# Patient Record
Sex: FEMALE | Race: ASIAN | NOT HISPANIC OR LATINO | Employment: STUDENT | URBAN - METROPOLITAN AREA
[De-identification: names, ages, dates, MRNs, and addresses within clinical notes are randomized per-mention and may not be internally consistent; named-entity substitution may affect disease eponyms.]

---

## 2017-03-29 ENCOUNTER — TRANSCRIBE ORDERS (OUTPATIENT)
Dept: ADMINISTRATIVE | Facility: HOSPITAL | Age: 22
End: 2017-03-29

## 2017-03-29 DIAGNOSIS — R10.32 ABDOMINAL PAIN, LEFT LOWER QUADRANT: Primary | ICD-10-CM

## 2017-03-29 DIAGNOSIS — R10.31 ABDOMINAL PAIN, RIGHT LOWER QUADRANT: ICD-10-CM

## 2017-03-29 DIAGNOSIS — R10.13 ABDOMINAL PAIN, EPIGASTRIC: ICD-10-CM

## 2017-04-05 ENCOUNTER — HOSPITAL ENCOUNTER (OUTPATIENT)
Dept: RADIOLOGY | Facility: HOSPITAL | Age: 22
Discharge: HOME | End: 2017-04-05
Attending: INTERNAL MEDICINE
Payer: COMMERCIAL

## 2017-04-05 ENCOUNTER — HOSPITAL ENCOUNTER (OUTPATIENT)
Dept: RADIOLOGY | Facility: HOSPITAL | Age: 22
Discharge: HOME | End: 2017-04-05
Payer: COMMERCIAL

## 2017-04-05 DIAGNOSIS — R10.31 ABDOMINAL PAIN, RIGHT LOWER QUADRANT: ICD-10-CM

## 2017-04-05 DIAGNOSIS — R10.32 ABDOMINAL PAIN, LEFT LOWER QUADRANT: ICD-10-CM

## 2017-04-05 DIAGNOSIS — R10.13 ABDOMINAL PAIN, EPIGASTRIC: ICD-10-CM

## 2017-04-05 PROCEDURE — 76830 TRANSVAGINAL US NON-OB: CPT

## 2017-04-05 PROCEDURE — 76700 US EXAM ABDOM COMPLETE: CPT

## 2017-04-05 PROCEDURE — 76856 US EXAM PELVIC COMPLETE: CPT

## 2017-09-01 ENCOUNTER — TRANSCRIBE ORDERS (OUTPATIENT)
Dept: ADMINISTRATIVE | Facility: HOSPITAL | Age: 22
End: 2017-09-01

## 2017-09-01 DIAGNOSIS — I10 UNCONTROLLED HYPERTENSION: Primary | ICD-10-CM

## 2017-09-02 ENCOUNTER — HOSPITAL ENCOUNTER (OUTPATIENT)
Dept: RADIOLOGY | Facility: HOSPITAL | Age: 22
Discharge: HOME | End: 2017-09-02
Attending: INTERNAL MEDICINE
Payer: COMMERCIAL

## 2017-09-02 DIAGNOSIS — I10 UNCONTROLLED HYPERTENSION: ICD-10-CM

## 2017-09-02 PROCEDURE — 93975 VASCULAR STUDY: CPT

## 2021-02-11 ENCOUNTER — TELEPHONE (OUTPATIENT)
Dept: GASTROENTEROLOGY | Facility: CLINIC | Age: 26
End: 2021-02-11

## 2021-02-11 NOTE — TELEPHONE ENCOUNTER
Patient called leaving message to call and schedule an appt with Dr Ai Miner  I returned call, 2 times, phone ran twice and went to busy signal  I tried a third time and went straight to voicemail  Left message for her to call back

## 2021-02-11 NOTE — TELEPHONE ENCOUNTER
Pts mom called asking if pt could have a colonoscopy done? In [address] the recall is for 5/24, She had on in 2019    Mom says pt can feel a polyp  I offered her an OV  She asked that I message you and ask  Please advise what you would like to do  Thank you

## 2021-02-12 NOTE — TELEPHONE ENCOUNTER
LMOM for pt to call back to see when she wants to get scheduled for colon  Will try again next week to reach pt

## 2021-04-08 ENCOUNTER — TELEPHONE (OUTPATIENT)
Dept: GASTROENTEROLOGY | Facility: CLINIC | Age: 26
End: 2021-04-08

## 2021-04-08 NOTE — TELEPHONE ENCOUNTER
Spoke to mother, Jorge A Butterfield whom informed pt needs to r/s her colon to July due to college exams  Pt is looking for 7/19, 7/21 or 7/22 with Dr Evie Henriquez  I told her that I would call her to r/s pt once schedule for July opens up

## 2021-04-22 NOTE — TELEPHONE ENCOUNTER
See below  Only 7/6 is open so far for Dr Feli Howell  Will continue to watch [facility] schedule for July and the requested dates that pt is asking for

## 2021-04-30 ENCOUNTER — PREP FOR PROCEDURE (OUTPATIENT)
Dept: GASTROENTEROLOGY | Facility: CLINIC | Age: 26
End: 2021-04-30

## 2021-04-30 DIAGNOSIS — K62.6 ULCER OF ANUS AND RECTUM: ICD-10-CM

## 2021-04-30 DIAGNOSIS — K58.0 IRRITABLE BOWEL SYNDROME WITH DIARRHEA: ICD-10-CM

## 2021-04-30 DIAGNOSIS — Z86.010 HX OF COLONIC POLYPS: Primary | ICD-10-CM

## 2021-04-30 NOTE — TELEPHONE ENCOUNTER
Pt scheduled at [facility] [place] on 7/19/21 due to needing that date with Dr Taylor Macario  Instructions mailed to pt

## 2021-06-01 ENCOUNTER — PREP FOR PROCEDURE (OUTPATIENT)
Dept: GASTROENTEROLOGY | Facility: CLINIC | Age: 26
End: 2021-06-01

## 2021-06-01 ENCOUNTER — NURSE TRIAGE (OUTPATIENT)
Dept: OTHER | Facility: OTHER | Age: 26
End: 2021-06-01

## 2021-06-01 ENCOUNTER — TELEPHONE (OUTPATIENT)
Dept: PREADMISSION TESTING | Facility: HOSPITAL | Age: 26
End: 2021-06-01

## 2021-06-01 DIAGNOSIS — Z01.818 PREOPERATIVE TESTING: Primary | ICD-10-CM

## 2021-06-01 DIAGNOSIS — Z01.818 PREOPERATIVE TESTING: ICD-10-CM

## 2021-06-01 PROCEDURE — U0005 INFEC AGEN DETEC AMPLI PROBE: HCPCS | Performed by: INTERNAL MEDICINE

## 2021-06-01 PROCEDURE — U0003 INFECTIOUS AGENT DETECTION BY NUCLEIC ACID (DNA OR RNA); SEVERE ACUTE RESPIRATORY SYNDROME CORONAVIRUS 2 (SARS-COV-2) (CORONAVIRUS DISEASE [COVID-19]), AMPLIFIED PROBE TECHNIQUE, MAKING USE OF HIGH THROUGHPUT TECHNOLOGIES AS DESCRIBED BY CMS-2020-01-R: HCPCS | Performed by: INTERNAL MEDICINE

## 2021-06-01 NOTE — TELEPHONE ENCOUNTER
Patient is requesting a covid test and does not have a [facility]'s PCP  Reports having an out of network PCP  Order was previously placed by GI  Patient informed of closest testing site and was advised of hours of operation, address, to wear a mask, and to stay in the car  Patient verbalized understanding  Link sent to patients email address to create a [employer] account to check for results  Reason for Disposition   [1] COVID-19 infection suspected by caller or triager AND [2] mild symptoms (cough, fever, or others) AND [7] no complications or SOB    Answer Assessment - Initial Assessment Questions  Were you within 6 feet or less, for up to 15 minutes or more with a person that has a confirmed COVID-19 test? Unknown     What was the date of your exposure? Unknown, "Her father might have it  He is having symptoms but we are waiting for the results to come back "     Are you experiencing any symptoms attributed to the virus?  (Assess for SOB, cough, fever, difficulty breathing) Diarrhea, fever, fatigue, body aches     HIGH RISK: Do you have any history heart or lung conditions, weakened immune system, diabetes, Asthma, CHF, HIV, COPD, Chemo, renal failure, sickle cell, etc? Denies     PREGNANCY: Are you pregnant or did you recently give birth?  Denies    Protocols used: CORONAVIRUS (COVID-19) DIAGNOSED OR SUSPECTED-ADULT-

## 2021-06-02 ENCOUNTER — TELEPHONE (OUTPATIENT)
Dept: PREADMISSION TESTING | Facility: HOSPITAL | Age: 26
End: 2021-06-02

## 2021-06-02 ENCOUNTER — TELEPHONE (OUTPATIENT)
Dept: GASTROENTEROLOGY | Facility: AMBULARY SURGERY CENTER | Age: 26
End: 2021-06-02

## 2021-06-02 ENCOUNTER — TELEPHONE (OUTPATIENT)
Dept: GASTROENTEROLOGY | Facility: CLINIC | Age: 26
End: 2021-06-02

## 2021-06-02 VITALS — WEIGHT: 107 LBS | BODY MASS INDEX: 19.57 KG/M2

## 2021-06-02 LAB — SARS-COV-2 RNA RESP QL NAA+PROBE: NEGATIVE

## 2021-06-02 RX ORDER — NORGESTIMATE AND ETHINYL ESTRADIOL 0.25-0.035
1 KIT ORAL DAILY
COMMUNITY

## 2021-06-02 RX ORDER — MESALAMINE 1000 MG/1
1000 SUPPOSITORY RECTAL
COMMUNITY
End: 2021-07-19 | Stop reason: SDUPTHER

## 2021-06-02 RX ORDER — DIPHENOXYLATE HYDROCHLORIDE AND ATROPINE SULFATE 2.5; .025 MG/1; MG/1
1 TABLET ORAL DAILY
COMMUNITY

## 2021-06-02 RX ORDER — SODIUM CHLORIDE, SODIUM LACTATE, POTASSIUM CHLORIDE, CALCIUM CHLORIDE 600; 310; 30; 20 MG/100ML; MG/100ML; MG/100ML; MG/100ML
125 INJECTION, SOLUTION INTRAVENOUS CONTINUOUS
Status: CANCELLED | OUTPATIENT
Start: 2021-06-02

## 2021-06-02 RX ORDER — MULTIVIT WITH MINERALS/LUTEIN
250 TABLET ORAL DAILY
COMMUNITY

## 2021-06-02 RX ORDER — CHLORAL HYDRATE 500 MG
1000 CAPSULE ORAL DAILY
COMMUNITY

## 2021-06-02 RX ORDER — AMLODIPINE BESYLATE 2.5 MG/1
2.5 TABLET ORAL
COMMUNITY

## 2021-06-02 NOTE — TELEPHONE ENCOUNTER
Patient is scheduled for colonoscopy tomorrow 6/3/21  Mom called and would like you to know she is mentally very fragile right now  She requests no mention of biopsy or anything bad or serious be discussed with her  She believes she will have another break down as she is in the middle of finalls, a break up and is in therapy      Thank you

## 2021-06-02 NOTE — PRE-PROCEDURE INSTRUCTIONS
Pre-Surgery Instructions:   Medication Instructions    amLODIPine (NORVASC) 2 5 mg tablet Instructed patient per Anesthesia Guidelines   ascorbic acid (VITAMIN C) 250 mg tablet Patient was instructed by Physician and understands   Bacillus Coagulans-Inulin (ALIGN PREBIOTIC-PROBIOTIC PO) Patient was instructed by Physician and understands   desvenlafaxine succinate (PRISTIQ) 50 mg 24 hr tablet Patient was instructed by Physician and understands   Flaxseed, Linseed, (FLAX SEEDS PO) Patient was instructed by Physician and understands   levothyroxine 50 mcg tablet Instructed patient per Anesthesia Guidelines   mesalamine (CANASA) 1,000 mg suppository Patient was instructed by Physician and understands   multivitamin [facility]) TABS Patient was instructed by Physician and understands   norgestimate-ethinyl estradiol (Sprintec 28) 0 25-35 MG-MCG per tablet Patient was instructed by Physician and understands   Omega-3 Fatty Acids (fish oil) 1,000 mg Patient was instructed by Physician and understands

## 2021-06-03 ENCOUNTER — ANESTHESIA EVENT (OUTPATIENT)
Dept: GASTROENTEROLOGY | Facility: AMBULARY SURGERY CENTER | Age: 26
End: 2021-06-03

## 2021-06-03 ENCOUNTER — HOSPITAL ENCOUNTER (OUTPATIENT)
Dept: GASTROENTEROLOGY | Facility: AMBULARY SURGERY CENTER | Age: 26
Setting detail: OUTPATIENT SURGERY
Discharge: HOME | End: 2021-06-03
Attending: INTERNAL MEDICINE
Payer: COMMERCIAL

## 2021-06-03 ENCOUNTER — ANESTHESIA (OUTPATIENT)
Dept: GASTROENTEROLOGY | Facility: AMBULARY SURGERY CENTER | Age: 26
End: 2021-06-03

## 2021-06-03 VITALS
DIASTOLIC BLOOD PRESSURE: 88 MMHG | HEART RATE: 80 BPM | WEIGHT: 107 LBS | SYSTOLIC BLOOD PRESSURE: 138 MMHG | HEIGHT: 62 IN | TEMPERATURE: 98 F | RESPIRATION RATE: 18 BRPM | BODY MASS INDEX: 19.69 KG/M2 | OXYGEN SATURATION: 100 %

## 2021-06-03 DIAGNOSIS — Z86.010 HX OF COLONIC POLYPS: ICD-10-CM

## 2021-06-03 DIAGNOSIS — K58.0 IRRITABLE BOWEL SYNDROME WITH DIARRHEA: ICD-10-CM

## 2021-06-03 DIAGNOSIS — K62.6 ULCER OF ANUS AND RECTUM: ICD-10-CM

## 2021-06-03 PROBLEM — E03.9 HYPOTHYROIDISM: Status: ACTIVE | Noted: 2021-06-03

## 2021-06-03 PROBLEM — I10 HTN (HYPERTENSION): Status: ACTIVE | Noted: 2021-06-03

## 2021-06-03 PROBLEM — K62.3 INCOMPLETE RECTAL PROLAPSE: Status: ACTIVE | Noted: 2021-06-03

## 2021-06-03 LAB
EXT PREGNANCY TEST URINE: NEGATIVE
EXT. CONTROL: NORMAL

## 2021-06-03 PROCEDURE — 45380 COLONOSCOPY AND BIOPSY: CPT | Performed by: INTERNAL MEDICINE

## 2021-06-03 PROCEDURE — 88305 TISSUE EXAM BY PATHOLOGIST: CPT | Performed by: PATHOLOGY

## 2021-06-03 PROCEDURE — 81025 URINE PREGNANCY TEST: CPT | Performed by: ANESTHESIOLOGY

## 2021-06-03 RX ORDER — LIDOCAINE HYDROCHLORIDE 10 MG/ML
INJECTION, SOLUTION EPIDURAL; INFILTRATION; INTRACAUDAL; PERINEURAL AS NEEDED
Status: DISCONTINUED | OUTPATIENT
Start: 2021-06-03 | End: 2021-06-03

## 2021-06-03 RX ORDER — PROPOFOL 10 MG/ML
INJECTION, EMULSION INTRAVENOUS AS NEEDED
Status: DISCONTINUED | OUTPATIENT
Start: 2021-06-03 | End: 2021-06-03

## 2021-06-03 RX ORDER — ONDANSETRON 2 MG/ML
4 INJECTION INTRAMUSCULAR; INTRAVENOUS ONCE AS NEEDED
Status: CANCELLED | OUTPATIENT
Start: 2021-06-03

## 2021-06-03 RX ORDER — SODIUM CHLORIDE, SODIUM LACTATE, POTASSIUM CHLORIDE, CALCIUM CHLORIDE 600; 310; 30; 20 MG/100ML; MG/100ML; MG/100ML; MG/100ML
125 INJECTION, SOLUTION INTRAVENOUS CONTINUOUS
Status: DISCONTINUED | OUTPATIENT
Start: 2021-06-03 | End: 2021-06-07 | Stop reason: HOSPADM

## 2021-06-03 RX ORDER — PROPOFOL 10 MG/ML
INJECTION, EMULSION INTRAVENOUS CONTINUOUS PRN
Status: DISCONTINUED | OUTPATIENT
Start: 2021-06-03 | End: 2021-06-03

## 2021-06-03 RX ADMIN — PROPOFOL 100 MG: 10 INJECTION, EMULSION INTRAVENOUS at 11:09

## 2021-06-03 RX ADMIN — PROPOFOL 130 MCG/KG/MIN: 10 INJECTION, EMULSION INTRAVENOUS at 11:09

## 2021-06-03 RX ADMIN — SODIUM CHLORIDE, SODIUM LACTATE, POTASSIUM CHLORIDE, AND CALCIUM CHLORIDE 125 ML/HR: .6; .31; .03; .02 INJECTION, SOLUTION INTRAVENOUS at 10:52

## 2021-06-03 RX ADMIN — LIDOCAINE HYDROCHLORIDE 50 MG: 10 INJECTION, SOLUTION EPIDURAL; INFILTRATION; INTRACAUDAL; PERINEURAL at 11:09

## 2021-06-03 NOTE — ANESTHESIA PREPROCEDURE EVALUATION
Procedure:  COLONOSCOPY    Relevant Problems   ANESTHESIA (within normal limits)      CARDIO   (+) HTN (hypertension)      ENDO   (+) Hypothyroidism      PULMONARY (within normal limits)        Physical Exam    Airway    Mallampati score: I  TM Distance: >3 FB  Neck ROM: full     Dental   No notable dental hx     Cardiovascular  Rhythm: regular, Rate: normal,     Pulmonary  Breath sounds clear to auscultation,     Other Findings        Anesthesia Plan  ASA Score- 2     Anesthesia Type- IV sedation with anesthesia with ASA Monitors  Additional Monitors:   Airway Plan:           Plan Factors-Exercise tolerance (METS): >4 METS  Chart reviewed  Existing labs reviewed  Patient summary reviewed  Patient is not a current smoker  Induction- intravenous  Postoperative Plan-     Informed Consent- Anesthetic plan and risks discussed with patient  I personally reviewed this patient with the CRNA  Discussed and agreed on the Anesthesia Plan with the CRNA  Betty Sheridan

## 2021-06-03 NOTE — ANESTHESIA POSTPROCEDURE EVALUATION
Post-Op Assessment Note    CV Status:  Stable  Pain Score: 0    Pain management: adequate     Mental Status:  Sleepy   Hydration Status:  Stable   PONV Controlled:  None   Airway Patency:  Patent   Two or more mitigation strategies used for obstructive sleep apnea   Post Op Vitals Reviewed: Yes      Staff: CRNA         No complications documented      BP   117/72   Temp   97   Pulse 99   Resp 16   SpO2 99

## 2021-06-03 NOTE — H&P
History and Physical - [facility]  Jason Mercedes 32 y o  female MRN: [id number]                  HPI: Jason Mercedes is a 32y o  year old female who presents for colonoscopy due to rectal bleeding and history of rectal prolapse      REVIEW OF SYSTEMS: Per the HPI, and otherwise unremarkable  Historical Information   Past Medical History:   Diagnosis Date    Anxiety     Depression     Disease of thyroid gland     Hypertension     IBS (irritable bowel syndrome)      Past Surgical History:   Procedure Laterality Date    APPENDECTOMY      COLONOSCOPY       Social History   Social History     Substance and Sexual Activity   Alcohol Use Yes    Frequency: 2-3 times a week     Social History     Substance and Sexual Activity   Drug Use No     Social History     Tobacco Use   Smoking Status Never Smoker   Smokeless Tobacco Never Used     History reviewed  No pertinent family history      Meds/Allergies       Current Outpatient Medications:     amLODIPine (NORVASC) 2 5 mg tablet    ascorbic acid (VITAMIN C) 250 mg tablet    Bacillus Coagulans-Inulin (ALIGN PREBIOTIC-PROBIOTIC PO)    desvenlafaxine succinate (PRISTIQ) 50 mg 24 hr tablet    Flaxseed, Linseed, (FLAX SEEDS PO)    levothyroxine 50 mcg tablet    mesalamine (CANASA) 1,000 mg suppository    multivitamin (THERAGRAN) TABS    norgestimate-ethinyl estradiol (Sprintec 28) 0 25-35 MG-MCG per tablet    Omega-3 Fatty Acids (fish oil) 1,000 mg    ondansetron (ZOFRAN-ODT) 4 mg disintegrating tablet    Current Facility-Administered Medications:     lactated ringers infusion, 125 mL/hr, Intravenous, Continuous, 125 mL/hr at 06/03/21 1052    Allergies   Allergen Reactions    Sulfa Antibiotics Hives       Objective     /95   Pulse 96   Temp 98 °F (36 7 °C) (Temporal)   Resp 18   Ht 5' 2" (1 575 m)   Wt 48 5 kg (107 lb)   LMP 06/02/2021 (Exact Date) Comment: HCG neg [id number]  SpO2 100%   BMI 19 57 kg/m²       PHYSICAL EXAM    Gen: NAD  Head: NCAT  CV: RRR  CHEST: Clear  ABD: soft, NT/ND  EXT: no edema      ASSESSMENT/PLAN:  This is a 32y o  year old female here for colonoscopy, and she is stable and optimized for her procedure

## 2021-06-08 ENCOUNTER — TELEPHONE (OUTPATIENT)
Dept: GASTROENTEROLOGY | Facility: CLINIC | Age: 26
End: 2021-06-08

## 2021-07-19 DIAGNOSIS — K62.6 ULCER OF ANUS AND RECTUM: Primary | ICD-10-CM

## 2021-07-19 RX ORDER — MESALAMINE 1000 MG/1
1000 SUPPOSITORY RECTAL
Qty: 90 SUPPOSITORY | Refills: 2 | Status: SHIPPED | OUTPATIENT
Start: 2021-07-19 | End: 2021-07-29 | Stop reason: SDUPTHER

## 2021-07-19 NOTE — TELEPHONE ENCOUNTER
PT MOTHER LEFT MESSAGE, PT WITH RECENT PROCEDURE, REQUESTING REFILL CANASA FOR 90 DAY SUPPLY WITH REFILLS

## 2021-07-23 LAB — HBA1C MFR BLD HPLC: 5.3 %

## 2021-07-29 ENCOUNTER — TELEPHONE (OUTPATIENT)
Dept: GASTROENTEROLOGY | Facility: CLINIC | Age: 26
End: 2021-07-29

## 2021-07-29 NOTE — TELEPHONE ENCOUNTER
Pt called and lmom that she would like a refill on her mesalamine  Can you call that in for her? Please and thank you

## 2021-07-29 NOTE — TELEPHONE ENCOUNTER
It appears mesalamine suppository was sent by ronni on 7/19 to [facility] but I resent it again today  The only mesalamine I see in her chart is the suppositories

## 2021-08-03 ENCOUNTER — TELEPHONE (OUTPATIENT)
Dept: GASTROENTEROLOGY | Facility: CLINIC | Age: 26
End: 2021-08-03

## 2021-08-03 NOTE — TELEPHONE ENCOUNTER
I will refill  Patient does not need to hold her canasa  This medication is an anti-inflammatory and has no immunosuppressive effects   She should continue taking as prescribed

## 2021-08-03 NOTE — TELEPHONE ENCOUNTER
Pt is aware  She would like the refill to go to [facility] on [address]  [place]  Please resend the script  Thank You!

## 2021-08-03 NOTE — TELEPHONE ENCOUNTER
Pt states she has been exposed to covid 23 and is at risk for getting it and she is asking if she should hold her canasa since she is at risk for getting covid in the next few days  Also she will need a refill

## 2021-10-30 ENCOUNTER — HOSPITAL ENCOUNTER (OUTPATIENT)
Dept: ULTRASOUND IMAGING | Facility: HOSPITAL | Age: 26
Discharge: HOME | End: 2021-10-30
Attending: INTERNAL MEDICINE
Payer: COMMERCIAL

## 2021-10-30 DIAGNOSIS — N39.0 URINARY TRACT INFECTION WITHOUT HEMATURIA, SITE UNSPECIFIED: ICD-10-CM

## 2021-10-30 PROCEDURE — 76770 US EXAM ABDO BACK WALL COMP: CPT

## 2022-10-14 DIAGNOSIS — I10 PRIMARY HYPERTENSION: Primary | ICD-10-CM

## 2022-10-14 RX ORDER — AMLODIPINE BESYLATE 2.5 MG/1
TABLET ORAL
Qty: 90 TABLET | Refills: 0 | Status: SHIPPED | OUTPATIENT
Start: 2022-10-14

## 2022-10-20 ENCOUNTER — OFFICE VISIT (OUTPATIENT)
Dept: GASTROENTEROLOGY | Facility: CLINIC | Age: 27
End: 2022-10-20
Payer: COMMERCIAL

## 2022-10-20 VITALS
HEIGHT: 62 IN | HEART RATE: 89 BPM | DIASTOLIC BLOOD PRESSURE: 95 MMHG | WEIGHT: 113 LBS | BODY MASS INDEX: 20.8 KG/M2 | SYSTOLIC BLOOD PRESSURE: 118 MMHG

## 2022-10-20 DIAGNOSIS — K62.6 ULCER OF ANUS AND RECTUM: ICD-10-CM

## 2022-10-20 DIAGNOSIS — K58.0 IRRITABLE BOWEL SYNDROME WITH DIARRHEA: ICD-10-CM

## 2022-10-20 DIAGNOSIS — Z86.010 HX OF COLONIC POLYPS: ICD-10-CM

## 2022-10-20 DIAGNOSIS — K59.02 CONSTIPATION DUE TO OUTLET DYSFUNCTION: Primary | ICD-10-CM

## 2022-10-20 PROCEDURE — 99214 OFFICE O/P EST MOD 30 MIN: CPT | Performed by: INTERNAL MEDICINE

## 2022-10-20 RX ORDER — NORETHINDRONE ACETATE AND ETHINYL ESTRADIOL AND FERROUS FUMARATE 1MG-20(24)
1 KIT ORAL DAILY
COMMUNITY

## 2022-10-20 RX ORDER — DOCUSATE SODIUM 100 MG/1
100 CAPSULE, LIQUID FILLED ORAL 2 TIMES DAILY
Qty: 90 CAPSULE | Refills: 1 | Status: SHIPPED | OUTPATIENT
Start: 2022-10-20

## 2022-10-20 RX ORDER — AMLODIPINE BESYLATE 5 MG/1
5 TABLET ORAL DAILY
COMMUNITY

## 2022-10-20 NOTE — PROGRESS NOTES
[facility] - Outpatient Follow-up Note  Glo Chowdary 32 y o  female MRN: [id number]  Encounter: [id number]          ASSESSMENT AND PLAN:      1  Constipation due to outlet dysfunction  She gets bloated from Metamucil we discussed low-dose docusate sodium  As well as a squatty potty  She tells me she does on occasion have to remove the stools has to put her knees up  She was seen in the past by colorectal surgeon for partial rectal prolapse she did have band ligation of hemorrhoids years ago  - docusate sodium (COLACE) 100 mg capsule; Take 1 capsule (100 mg total) by mouth 2 (two) times a day  Dispense: 90 capsule; Refill: 1    2  Ulcer of anus and rectum  Benign in nature several biopsies  Her last colonoscopy was June of 2021  Ulcerations were suspect to be secondary to rectal prolapse  She did very well for several years using Canasa  She recently stopped it then started having small amounts of rectal bleeding  3  Irritable bowel syndrome with diarrhea  Quiescent    4  Hx of colonic polyps  History of adenomatous polyps next colonoscopy is due in 6 of 24  She will follow-up in 4 months  Should she have any further bleeding after a week of suppositories with Canasa she will call and we will schedule flex sig     ______________________________________________________________________    SUBJECTIVE:  80-year-old female who we seen in the past for rectal prolapse rectal bleeding hemorrhoidal prolapse  She is status post band ligation she has had colonoscopies with adenomatous polyps  Her surveillance is up-to-date  She does have IBS with diarrhea  She tells me recently is noted a small amount of blood spotting however she stop taking her Canasa suppositories which have held her in check for several years  REVIEW OF SYSTEMS IS OTHERWISE NEGATIVE        Historical Information   Past Medical History:   Diagnosis Date   • Anxiety    • Depression    • Disease of thyroid gland • Ear problems    • GERD (gastroesophageal reflux disease)    • Hypertension    • IBS (irritable bowel syndrome)      Past Surgical History:   Procedure Laterality Date   • APPENDECTOMY     • COLONOSCOPY       Social History   Social History     Substance and Sexual Activity   Alcohol Use Yes     Social History     Substance and Sexual Activity   Drug Use No     Social History     Tobacco Use   Smoking Status Never Smoker   Smokeless Tobacco Never Used     History reviewed  No pertinent family history  Meds/Allergies       Current Outpatient Medications:   •  amLODIPine (NORVASC) 5 mg tablet  •  ascorbic acid (VITAMIN C) 250 mg tablet  •  Bacillus Coagulans-Inulin (ALIGN PREBIOTIC-PROBIOTIC PO)  •  desvenlafaxine succinate (PRISTIQ) 50 mg 24 hr tablet  •  docusate sodium (COLACE) 100 mg capsule  •  Flaxseed, Linseed, (FLAX SEEDS PO)  •  levothyroxine 25 mcg tablet  •  levothyroxine 50 mcg tablet  •  mesalamine (CANASA) 1,000 mg suppository  •  multivitamin (THERAGRAN) TABS  •  norethindrone-ethinyl estradiol-ferrous fumarate (LOESTIN 24 FE) 1-20 MG-MCG(24) per tablet  •  Omega-3 Fatty Acids (fish oil) 1,000 mg  •  amLODIPine (NORVASC) 2 5 mg tablet  •  norgestimate-ethinyl estradiol (ORTHO-CYCLEN) 0 25-35 MG-MCG per tablet  •  ondansetron (ZOFRAN-ODT) 4 mg disintegrating tablet    Allergies   Allergen Reactions   • Sulfa Antibiotics Hives           Objective     Blood pressure 118/95, pulse 89, height 5' 2" (1 575 m), weight 51 3 kg (113 lb), not currently breastfeeding  Body mass index is 20 67 kg/m²  PHYSICAL EXAM:      General Appearance:   Alert, cooperative, no distress   HEENT:   Normocephalic, atraumatic, anicteric      Neck:  Supple, symmetrical, trachea midline   Lungs:   Clear to auscultation bilaterally; no rales, rhonchi or wheezing; respirations unlabored    Heart[de-identified]   Regular rate and rhythm; no murmur, rub, or gallop     Abdomen:   Soft, non-tender, non-distended; normal bowel sounds; no masses, no organomegaly    Genitalia:   Deferred    Rectal:   Deferred    Extremities:  No cyanosis, clubbing or edema    Pulses:  2+ and symmetric    Skin:  No jaundice, rashes, or lesions    Lymph nodes:  No palpable cervical lymphadenopathy        Lab Results:   No visits with results within 1 Day(s) from this visit  Latest known visit with results is:   Hospital Outpatient Visit on 2021   Component Date Value   • EXT Preg Test, Ur 2021 Negative    • Control 2021 Valid    • Case Report 2021                      Value:Surgical Pathology Report                         Case: [id number]                                   Authorizing Provider:  Rasta Mathew MD           Collected:           2021 1122              Ordering Location:     [facility]   Received:            [facility]                                                                       Pathologist:           Jami Apgar, MD                                                        Specimens:   A) - Polyp, Colorectal, Biopsy Proximal Rectal Polyp Cold Forcep                                    B) - Rectum, Biopsy Distal Rectum Erythema Cold Forcep                                    • Final Diagnosis 2021                      Value: This result contains rich text formatting which cannot be displayed here  • Note 2021                      Value: This result contains rich text formatting which cannot be displayed here  • Additional Information 2021                      Value: This result contains rich text formatting which cannot be displayed here  • Synoptic Checklist 2021                      Value:  (COLON/RECTUM POLYP FORM - GI - All Specimens)                                                                                                                 : Adenoma(s)     • Gross Description 2021                      Value: This result contains rich text formatting which cannot be displayed here  Radiology Results:   No results found

## 2022-10-27 ENCOUNTER — VBI (OUTPATIENT)
Dept: ADMINISTRATIVE | Facility: OTHER | Age: 27
End: 2022-10-27

## 2022-10-27 LAB
25(OH)D3+25(OH)D2 SERPL-MCNC: 24.7 NG/ML (ref 30–100)
ALBUMIN SERPL-MCNC: 4.7 G/DL (ref 3.9–5)
ALBUMIN/GLOB SERPL: 1.8 {RATIO} (ref 1.2–2.2)
ALP SERPL-CCNC: 52 IU/L (ref 44–121)
ALT SERPL-CCNC: 13 IU/L (ref 0–32)
APPEARANCE UR: CLEAR
AST SERPL-CCNC: 17 IU/L (ref 0–40)
BACTERIA UR CULT: NORMAL
BACTERIA URNS QL MICRO: ABNORMAL
BASOPHILS # BLD AUTO: 0 X10E3/UL (ref 0–0.2)
BASOPHILS NFR BLD AUTO: 0 %
BILIRUB SERPL-MCNC: 0.3 MG/DL (ref 0–1.2)
BILIRUB UR QL STRIP: NEGATIVE
BUN SERPL-MCNC: 10 MG/DL (ref 6–20)
BUN/CREAT SERPL: 14 (ref 9–23)
CALCIUM SERPL-MCNC: 9.6 MG/DL (ref 8.7–10.2)
CASTS URNS QL MICRO: ABNORMAL /LPF
CHLORIDE SERPL-SCNC: 103 MMOL/L (ref 96–106)
CHOLEST SERPL-MCNC: 195 MG/DL (ref 100–199)
CO2 SERPL-SCNC: 22 MMOL/L (ref 20–29)
COLOR UR: YELLOW
CREAT SERPL-MCNC: 0.69 MG/DL (ref 0.57–1)
CRYSTALS URNS MICRO: ABNORMAL
EGFR: 122 ML/MIN/1.73
EOSINOPHIL # BLD AUTO: 0.3 X10E3/UL (ref 0–0.4)
EOSINOPHIL NFR BLD AUTO: 3 %
EPI CELLS #/AREA URNS HPF: >10 /HPF (ref 0–10)
ERYTHROCYTE [DISTWIDTH] IN BLOOD BY AUTOMATED COUNT: 12.4 % (ref 11.7–15.4)
GLOBULIN SER-MCNC: 2.6 G/DL (ref 1.5–4.5)
GLUCOSE SERPL-MCNC: 87 MG/DL (ref 70–99)
GLUCOSE UR QL: NEGATIVE
HBA1C MFR BLD: 5.7 % (ref 4.8–5.6)
HCT VFR BLD AUTO: 42.2 % (ref 34–46.6)
HDLC SERPL-MCNC: 52 MG/DL
HGB BLD-MCNC: 14.8 G/DL (ref 11.1–15.9)
HGB UR QL STRIP: NEGATIVE
IMM GRANULOCYTES # BLD: 0 X10E3/UL (ref 0–0.1)
IMM GRANULOCYTES NFR BLD: 0 %
KETONES UR QL STRIP: NEGATIVE
LDLC SERPL CALC-MCNC: 122 MG/DL (ref 0–99)
LEUKOCYTE ESTERASE UR QL STRIP: ABNORMAL
LYMPHOCYTES # BLD AUTO: 2.8 X10E3/UL (ref 0.7–3.1)
LYMPHOCYTES NFR BLD AUTO: 22 %
Lab: NORMAL
MCH RBC QN AUTO: 29.3 PG (ref 26.6–33)
MCHC RBC AUTO-ENTMCNC: 35.1 G/DL (ref 31.5–35.7)
MCV RBC AUTO: 84 FL (ref 79–97)
MICRO URNS: ABNORMAL
MONOCYTES # BLD AUTO: 0.7 X10E3/UL (ref 0.1–0.9)
MONOCYTES NFR BLD AUTO: 5 %
NEUTROPHILS # BLD AUTO: 9.1 X10E3/UL (ref 1.4–7)
NEUTROPHILS NFR BLD AUTO: 70 %
NITRITE UR QL STRIP: NEGATIVE
PH UR STRIP: 6 [PH] (ref 5–7.5)
PLATELET # BLD AUTO: 386 X10E3/UL (ref 150–450)
POTASSIUM SERPL-SCNC: 4.2 MMOL/L (ref 3.5–5.2)
PROT SERPL-MCNC: 7.3 G/DL (ref 6–8.5)
PROT UR QL STRIP: ABNORMAL
RBC # BLD AUTO: 5.05 X10E6/UL (ref 3.77–5.28)
RBC #/AREA URNS HPF: ABNORMAL /HPF (ref 0–2)
SL AMB VLDL CHOLESTEROL CALC: 21 MG/DL (ref 5–40)
SODIUM SERPL-SCNC: 140 MMOL/L (ref 134–144)
SP GR UR: 1.02 (ref 1–1.03)
T4 FREE SERPL-MCNC: 1.44 NG/DL (ref 0.82–1.77)
TRIGL SERPL-MCNC: 120 MG/DL (ref 0–149)
TSH SERPL DL<=0.005 MIU/L-ACNC: 2.18 UIU/ML (ref 0.45–4.5)
UNIDENT CRYS URNS QL MICRO: PRESENT
UROBILINOGEN UR STRIP-ACNC: 0.2 MG/DL (ref 0.2–1)
WBC # BLD AUTO: 13 X10E3/UL (ref 3.4–10.8)
WBC #/AREA URNS HPF: ABNORMAL /HPF (ref 0–5)

## 2022-10-27 NOTE — TELEPHONE ENCOUNTER
Upon review of the In Basket request we were able to locate, review, and update the patient chart as requested for Hemoglobin A1c  Any additional questions or concerns should be emailed to the Practice Liaisons via the appropriate education email address, please do not reply via In Basket      Thank you  No Montgomery

## 2022-11-02 ENCOUNTER — OFFICE VISIT (OUTPATIENT)
Dept: FAMILY MEDICINE CLINIC | Facility: CLINIC | Age: 27
End: 2022-11-02

## 2022-11-02 VITALS
WEIGHT: 115 LBS | SYSTOLIC BLOOD PRESSURE: 126 MMHG | HEART RATE: 105 BPM | DIASTOLIC BLOOD PRESSURE: 90 MMHG | HEIGHT: 62 IN | OXYGEN SATURATION: 95 % | BODY MASS INDEX: 21.16 KG/M2

## 2022-11-02 DIAGNOSIS — E03.9 HYPOTHYROIDISM, UNSPECIFIED TYPE: ICD-10-CM

## 2022-11-02 DIAGNOSIS — I10 PRIMARY HYPERTENSION: Primary | ICD-10-CM

## 2022-11-02 RX ORDER — AMLODIPINE BESYLATE 5 MG/1
5 TABLET ORAL DAILY
Qty: 90 TABLET | Refills: 0 | Status: SHIPPED | OUTPATIENT
Start: 2022-11-02

## 2022-11-02 RX ORDER — AMLODIPINE BESYLATE 2.5 MG/1
2.5 TABLET ORAL DAILY
Qty: 90 TABLET | Refills: 0 | Status: SHIPPED | OUTPATIENT
Start: 2022-11-02

## 2022-11-02 RX ORDER — LEVOTHYROXINE SODIUM 0.03 MG/1
25 TABLET ORAL DAILY
Qty: 90 TABLET | Refills: 0 | Status: SHIPPED | OUTPATIENT
Start: 2022-11-02

## 2022-11-02 NOTE — ASSESSMENT & PLAN NOTE
Patient with a history of constipation which is better now also history of ulcerated the anus and rectal area which is benign     Patient with rectal prolapse currently using Canasa will continue with the same patient also with a history of irritable bowel with diarrhea which is stable

## 2022-11-02 NOTE — PROGRESS NOTES
Office Visit Note  22     Ricky Way 32 y o  female MRN: [id number]  : 1995    Assessment:     1  Primary hypertension  Assessment & Plan:  Patient has been having elevated blood pressure readings at home as well as at the gynecologist office and gastroenterologist office diastolic staying around 90  Currently she is taking amlodipine 5 mg daily  Will increase the dose to 7 5 mg and see the difference  We also discussed about adding small dose of diuretic  If necessary    Orders:  -     amLODIPine (NORVASC) 5 mg tablet; Take 1 tablet (5 mg total) by mouth daily  -     amLODIPine (NORVASC) 2 5 mg tablet; Take 1 tablet (2 5 mg total) by mouth daily    2  Hypothyroidism, unspecified type  Assessment & Plan:  Patient with hypothyroidism currently taking levothyroxine 25 mcg daily will continue with the same dose for now follow up with repeat lab at a later date  Orders:  -     levothyroxine 25 mcg tablet; Take 1 tablet (25 mcg total) by mouth daily        Discussion Summary and Plan: Today's care plan and medications were reviewed with patient in detail and all their questions answered to their satisfaction  Chief Complaint   Patient presents with   • Follow-up      Subjective:  Patient has come in for evaluation regarding hypertension she was recently seen by the gynecologist as well as the gastroenterologist office the pressures were running slightly on the higher side especially with diastolic around 90  Currently she is taking amlodipine 5 mg daily  At home also she is noticing her blood pressures with diastolic around 90  Labs reviewed LDL is on the higher side A1c the 5 7 family history of diabetes  Gastroenterologist report reviewed currently patient is taking Canasa suppositories at present time she is asymptomatic    Will increase the dose of the amlodipine to 7 5 mg 5 mg +2 5 if it is not getting under control will add a small dose of diuretic HCTZ 12 5 mg and see if it makes any difference  The following portions of the patient's history were reviewed and updated as appropriate: allergies, current medications, past family history, past medical history, past social history, past surgical history and problem list     Review of Systems   Constitutional: Negative for chills and fever  HENT: Negative for ear pain and sore throat  Eyes: Negative for pain and visual disturbance  Respiratory: Negative for cough and shortness of breath  Cardiovascular: Negative for chest pain and palpitations  Gastrointestinal: Negative for abdominal pain and vomiting  Genitourinary: Negative for dysuria and hematuria  Musculoskeletal: Negative for arthralgias and back pain  Skin: Negative for color change and rash  Neurological: Negative for seizures and syncope  All other systems reviewed and are negative  Historical Information   Patient Active Problem List   Diagnosis   • Hypothyroidism   • HTN (hypertension)   • Incomplete rectal prolapse     Past Medical History:   Diagnosis Date   • Anxiety    • Depression    • Disease of thyroid gland    • Ear problems    • GERD (gastroesophageal reflux disease)    • Hypertension    • IBS (irritable bowel syndrome)      Past Surgical History:   Procedure Laterality Date   • APPENDECTOMY     • COLONOSCOPY       Social History     Substance and Sexual Activity   Alcohol Use Yes     Social History     Substance and Sexual Activity   Drug Use No     Social History     Tobacco Use   Smoking Status Never Smoker   Smokeless Tobacco Never Used     History reviewed  No pertinent family history    Health Maintenance Due   Topic   • Hepatitis C Screening    • COVID-19 Vaccine (1)   • Depression Screening    • HIV Screening    • Annual Physical    • DTaP,Tdap,and Td Vaccines (1 - Tdap)   • Cervical Cancer Screening    • Influenza Vaccine (1)      Meds/Allergies       Current Outpatient Medications:   •  amLODIPine (NORVASC) 2 5 mg tablet, Take 1 tablet (2 5 mg total) by mouth daily, Disp: 90 tablet, Rfl: 0  •  amLODIPine (NORVASC) 5 mg tablet, Take 1 tablet (5 mg total) by mouth daily, Disp: 90 tablet, Rfl: 0  •  ascorbic acid (VITAMIN C) 250 mg tablet, Take 250 mg by mouth daily, Disp: , Rfl:   •  Bacillus Coagulans-Inulin (ALIGN PREBIOTIC-PROBIOTIC PO), Take by mouth, Disp: , Rfl:   •  desvenlafaxine succinate (PRISTIQ) 50 mg 24 hr tablet, Take 50 mg by mouth daily, Disp: , Rfl:   •  Flaxseed, Linseed, (FLAX SEEDS PO), Take by mouth, Disp: , Rfl:   •  levothyroxine 25 mcg tablet, Take 1 tablet (25 mcg total) by mouth daily, Disp: 90 tablet, Rfl: 0  •  mesalamine (CANASA) 1,000 mg suppository, Insert 1 suppository (1,000 mg total) into the rectum daily at bedtime, Disp: 90 suppository, Rfl: 2  •  multivitamin (THERAGRAN) TABS, Take 1 tablet by mouth daily, Disp: , Rfl:   •  norethindrone-ethinyl estradiol-ferrous fumarate (LOESTIN 24 FE) 1-20 MG-MCG(24) per tablet, Take 1 tablet by mouth daily, Disp: , Rfl:   •  docusate sodium (COLACE) 100 mg capsule, Take 1 capsule (100 mg total) by mouth 2 (two) times a day (Patient not taking: Reported on 11/2/2022), Disp: 90 capsule, Rfl: 1  •  ondansetron (ZOFRAN-ODT) 4 mg disintegrating tablet, Take 1 tablet by mouth every 8 (eight) hours as needed for nausea or vomiting for up to 3 days, Disp: 12 tablet, Rfl: 0      Objective:    Vitals:   /90 (BP Location: Right arm, Patient Position: Sitting, Cuff Size: Child)   Pulse 105   Ht 5' 2" (1 575 m)   Wt 52 2 kg (115 lb)   SpO2 95%   BMI 21 03 kg/m²   Body mass index is 21 03 kg/m²  Vitals:    11/02/22 0757   Weight: 52 2 kg (115 lb)       Physical Exam  Vitals and nursing note reviewed  Constitutional:       Appearance: Normal appearance  Cardiovascular:      Rate and Rhythm: Normal rate and regular rhythm  Heart sounds: Normal heart sounds  Pulmonary:      Effort: Pulmonary effort is normal       Breath sounds: Normal breath sounds     Musculoskeletal: Cervical back: Normal range of motion and neck supple  Right lower leg: No edema  Left lower leg: No edema  Neurological:      Mental Status: She is alert           Lab Review   VBI on 10/27/2022   Component Date Value Ref Range Status   • Hemoglobin A1C 07/23/2021 5 3   Final   Orders Only on 10/25/2022   Component Date Value Ref Range Status   • White Blood Cell Count 10/25/2022 13 0 (A) 3 4 - 10 8 x10E3/uL Final   • Red Blood Cell Count 10/25/2022 5 05  3 77 - 5 28 x10E6/uL Final   • Hemoglobin 10/25/2022 14 8  11 1 - 15 9 g/dL Final   • HCT 10/25/2022 42 2  34 0 - 46 6 % Final   • MCV 10/25/2022 84  79 - 97 fL Final   • MCH 10/25/2022 29 3  26 6 - 33 0 pg Final   • MCHC 10/25/2022 35 1  31 5 - 35 7 g/dL Final   • RDW 10/25/2022 12 4  11 7 - 15 4 % Final   • Platelet Count 63/56/1693 386  150 - 450 x10E3/uL Final   • Neutrophils 10/25/2022 70  Not Estab  % Final   • Lymphocytes 10/25/2022 22  Not Estab  % Final   • Monocytes 10/25/2022 5  Not Estab  % Final   • Eosinophils 10/25/2022 3  Not Estab  % Final   • Basophils PCT 10/25/2022 0  Not Estab  % Final   • Neutrophils (Absolute) 10/25/2022 9 1 (A) 1 4 - 7 0 x10E3/uL Final   • Lymphocytes (Absolute) 10/25/2022 2 8  0 7 - 3 1 x10E3/uL Final   • Monocytes (Absolute) 10/25/2022 0 7  0 1 - 0 9 x10E3/uL Final   • Eosinophils (Absolute) 10/25/2022 0 3  0 0 - 0 4 x10E3/uL Final   • Basophils ABS 10/25/2022 0 0  0 0 - 0 2 x10E3/uL Final   • Immature Granulocytes 10/25/2022 0  Not Estab  % Final   • Immature Granulocytes (Absolute) 10/25/2022 0 0  0 0 - 0 1 x10E3/uL Final   • Glucose, Random 10/25/2022 87  70 - 99 mg/dL Final   • BUN 10/25/2022 10  6 - 20 mg/dL Final   • Creatinine 10/25/2022 0 69  0 57 - 1 00 mg/dL Final   • eGFR 10/25/2022 122  >59 mL/min/1 73 Final   • SL AMB BUN/CREATININE RATIO 10/25/2022 14  9 - 23 Final   • Sodium 10/25/2022 140  134 - 144 mmol/L Final   • Potassium 10/25/2022 4 2  3 5 - 5 2 mmol/L Final   • Chloride 10/25/2022 103  96 - 106 mmol/L Final   • CO2 10/25/2022 22  20 - 29 mmol/L Final   • CALCIUM 10/25/2022 9 6  8 7 - 10 2 mg/dL Final   • Protein, Total 10/25/2022 7 3  6 0 - 8 5 g/dL Final   • Albumin 10/25/2022 4 7  3 9 - 5 0 g/dL Final   • Globulin, Total 10/25/2022 2 6  1 5 - 4 5 g/dL Final   • Albumin/Globulin Ratio 10/25/2022 1 8  1 2 - 2 2 Final   • TOTAL BILIRUBIN 10/25/2022 0 3  0 0 - 1 2 mg/dL Final   • Alk Phos Isoenzymes 10/25/2022 52  44 - 121 IU/L Final   • AST 10/25/2022 17  0 - 40 IU/L Final   • ALT 10/25/2022 13  0 - 32 IU/L Final   • Specific Gravity 10/25/2022 1 022  1 005 - 1 030 Final   • Ph 10/25/2022 6 0  5 0 - 7 5 Final   • Color UA 10/25/2022 Yellow  Yellow Final   • Urine Appearance 10/25/2022 Clear  Clear Final   • Leukocyte Esterase 10/25/2022 1+ (A) Negative Final   • Protein 10/25/2022 1+ (A) Negative/Trace Final   • Glucose, 24 HR Urine 10/25/2022 Negative  Negative Final   • Ketone, Urine 10/25/2022 Negative  Negative Final   • Blood, Urine 10/25/2022 Negative  Negative Final   • Bilirubin, Urine 10/25/2022 Negative  Negative Final   • Urobilinogen Urine 10/25/2022 0 2  0 2 - 1 0 mg/dL Final   • SL AMB NITRITES URINE, QUAL  10/25/2022 Negative  Negative Final   • Microscopic Examination 10/25/2022 See below:   Final    Microscopic was indicated and was performed     • SL AMB WBC, URINE 10/25/2022 0-5  0 - 5 /hpf Final   • RBC, Urine 10/25/2022 None seen  0 - 2 /hpf Final   • Epithelial Cells (non renal) 10/25/2022 >10 (A) 0 - 10 /hpf Final   • Casts 10/25/2022 None seen  None seen /lpf Final   • Crystals,ua 10/25/2022 Present (A) N/A Final   • Crystal Type 10/25/2022 Calcium Oxalate  N/A Final   • Bacteria, Urine 10/25/2022 Few  None seen/Few Final   • Cholesterol, Total 10/25/2022 195  100 - 199 mg/dL Final   • Triglycerides 10/25/2022 120  0 - 149 mg/dL Final   • HDL 10/25/2022 52  >39 mg/dL Final   • VLDL Cholesterol Calculated 10/25/2022 21  5 - 40 mg/dL Final   • LDL Calculated 10/25/2022 122 (A) 0 - 99 mg/dL Final   • Hemoglobin A1C 10/25/2022 5 7 (A) 4 8 - 5 6 % Final    Comment:          Prediabetes: 5 7 - 6 4           Diabetes: >6 4           Glycemic control for adults with diabetes: <7 0     • Free t4 10/25/2022 1 44  0 82 - 1 77 ng/dL Final   • TSH 10/25/2022 2 180  0 450 - 4 500 uIU/mL Final   • 25-HYDROXY VIT D 10/25/2022 24 7 (A) 30 0 - 100 0 ng/mL Final    Comment: Vitamin D deficiency has been defined by the [address] practice guideline as a  level of serum 25-OH vitamin D less than 20 ng/mL (1,2)  The Endocrine Society went on to further define vitamin D  insufficiency as a level between 21 and 29 ng/mL (2)  1  IOM ([place] [employer])  2010  Dietary reference     intakes for calcium and D  [address] [facility]: The     [employer]  2  Bryan MF, Lencho SMITH, Eder MALIN, et al      Evaluation, treatment, and prevention of vitamin D     deficiency: an Endocrine Society clinical practice     guideline  JCEM  2011 Jul; 96(7):1911-30  • Urine Culture Result 10/25/2022 Final report   Final   • Result 1 10/25/2022 Comment   Final    Comment: Culture shows less than 10,000 colony forming units of bacteria per  milliliter of urine  This colony count is not generally considered  to be clinically significant  Luis Alfredo Powers        "This note has been constructed using a voice recognition system  Therefore there may be syntax, spelling, and/or grammatical errors   Please call if you have any questions  "

## 2022-11-02 NOTE — ASSESSMENT & PLAN NOTE
Patient has been having elevated blood pressure readings at home as well as at the gynecologist office and gastroenterologist office diastolic staying around 90  Currently she is taking amlodipine 5 mg daily  Will increase the dose to 7 5 mg and see the difference  We also discussed about adding small dose of diuretic    If necessary

## 2022-11-02 NOTE — ASSESSMENT & PLAN NOTE
Patient with hypothyroidism currently taking levothyroxine 25 mcg daily will continue with the same dose for now follow up with repeat lab at a later date

## 2022-11-20 DIAGNOSIS — I10 PRIMARY HYPERTENSION: ICD-10-CM

## 2022-11-20 RX ORDER — AMLODIPINE BESYLATE 2.5 MG/1
TABLET ORAL
Qty: 90 TABLET | Refills: 0 | Status: SHIPPED | OUTPATIENT
Start: 2022-11-20

## 2023-02-11 DIAGNOSIS — I10 PRIMARY HYPERTENSION: ICD-10-CM

## 2023-02-12 RX ORDER — AMLODIPINE BESYLATE 5 MG/1
5 TABLET ORAL DAILY
Qty: 90 TABLET | Refills: 0 | Status: SHIPPED | OUTPATIENT
Start: 2023-02-12

## 2023-02-13 ENCOUNTER — TELEPHONE (OUTPATIENT)
Dept: GASTROENTEROLOGY | Facility: CLINIC | Age: 28
End: 2023-02-13

## 2023-02-13 NOTE — TELEPHONE ENCOUNTER
Scheduled date of sigmoidoscopy (as of today): 2/16/23  Physician performing sigmoidoscopy: Dr Lorraine Worthington   Location: [facility]  Instructions reviewed with patient by: ls   Clearances:  N/a

## 2023-02-13 NOTE — TELEPHONE ENCOUNTER
Patients GI provider:  Dr Dipika Lynn    Number to return call: [phone]    Reason for call: Pt calling to schedule flex sigmoidoscopy       Scheduled procedure/appointment date if applicable: no apts or procedures

## 2023-02-13 NOTE — TELEPHONE ENCOUNTER
Sophia, I added pt for a flexsig to Dr Bates Salts [facility] schedule on 2/16/23  She has a [employer] insurance  I did verify everything, however, still rejecting  She gave me the phone number for providers of [phone]    Thank you so much for your help!!

## 2023-02-13 NOTE — TELEPHONE ENCOUNTER
[facility] 27 Assessment    Name: Juan Castillo  YOB: 1995  Last Height: 5' 2" (1 575 m)  Last weight: 52 2 kg (115 lb)  BMI: 21 03 kg/m²  Procedure: Flex  Diagnosis: see order  Date of procedure: 2/16/23  Prep: flex sig prep  Responsible : yes  Phone#: [phone]  Name completing form: Brenton Go  Date form completed: 02/13/23      If the patient answers yes to any of these questions, schedule in a hospital  Are you pregnant: No  Do you rely on a wheelchair for mobility: No  Have you been diagnosed with End Stage Renal Disease (ESRD): No  Do you need oxygen during the day: No  Have you had a heart attack or stroke within the past three months: No  Have you had a seizure within the past three months: No  Have you ever been informed by anesthesia that you have a difficult airway: No  Additional Questions  Have you had any cardiac testing or are under the care of a Cardiologist (see cardiac list): No  Cardiac list:   Do you have an implanted cardiac defibrillator: No (Comment:  This patient should be scheduled in the hospital)    Have any bleeding problems, such as anemia or hemophilia (If patient has H&H result below 8, schedule in hospital   H&H must be within 30 days of procedure): No    Had an organ transplant within the past 3 months: No    Do you have any present infections: No  Do you get short of breath when walking a few blocks: No  Have you been diagnosed with diabetes: No  Comments (provide cardiac provider information if applicable):

## 2023-03-24 DIAGNOSIS — E03.9 HYPOTHYROIDISM, UNSPECIFIED TYPE: ICD-10-CM

## 2023-03-24 RX ORDER — LEVOTHYROXINE SODIUM 0.03 MG/1
TABLET ORAL
Qty: 90 TABLET | Refills: 0 | Status: SHIPPED | OUTPATIENT
Start: 2023-03-24

## 2023-04-03 ENCOUNTER — TELEPHONE (OUTPATIENT)
Dept: OTHER | Facility: OTHER | Age: 28
End: 2023-04-03

## 2023-04-03 NOTE — TELEPHONE ENCOUNTER
Patient is calling regarding cancelling an appointment      Date/Time: 4 3 2023 @ 8am with Dr Zoran De La Garza     Patient was rescheduled: YES [] NO [x]    Patient requesting call back to reschedule: YES [x] NO []

## 2023-05-03 ENCOUNTER — OFFICE VISIT (OUTPATIENT)
Dept: FAMILY MEDICINE CLINIC | Facility: CLINIC | Age: 28
End: 2023-05-03

## 2023-05-03 VITALS
HEIGHT: 62 IN | WEIGHT: 114.2 LBS | BODY MASS INDEX: 21.02 KG/M2 | OXYGEN SATURATION: 98 % | DIASTOLIC BLOOD PRESSURE: 90 MMHG | HEART RATE: 82 BPM | SYSTOLIC BLOOD PRESSURE: 130 MMHG

## 2023-05-03 DIAGNOSIS — R73.03 PREDIABETES: ICD-10-CM

## 2023-05-03 DIAGNOSIS — F41.1 GENERALIZED ANXIETY DISORDER: ICD-10-CM

## 2023-05-03 DIAGNOSIS — K62.3 INCOMPLETE RECTAL PROLAPSE: ICD-10-CM

## 2023-05-03 DIAGNOSIS — I10 PRIMARY HYPERTENSION: Primary | ICD-10-CM

## 2023-05-03 DIAGNOSIS — E03.9 ACQUIRED HYPOTHYROIDISM: ICD-10-CM

## 2023-05-03 RX ORDER — AMLODIPINE BESYLATE 5 MG/1
5 TABLET ORAL DAILY
Qty: 90 TABLET | Refills: 0 | Status: SHIPPED | OUTPATIENT
Start: 2023-05-03

## 2023-05-03 RX ORDER — HYDROCHLOROTHIAZIDE 12.5 MG/1
12.5 TABLET ORAL DAILY
Qty: 90 TABLET | Refills: 0 | Status: SHIPPED | OUTPATIENT
Start: 2023-05-03 | End: 2024-04-27

## 2023-05-03 RX ORDER — NORETHINDRONE ACETATE AND ETHINYL ESTRADIOL, ETHINYL ESTRADIOL AND FERROUS FUMARATE 1MG-10(24)
KIT ORAL
COMMUNITY
Start: 2023-05-03

## 2023-05-03 RX ORDER — VENLAFAXINE 50 MG/1
50 TABLET ORAL EVERY MORNING
COMMUNITY
Start: 2023-02-11

## 2023-05-03 RX ORDER — AMLODIPINE BESYLATE 2.5 MG/1
2.5 TABLET ORAL DAILY
Qty: 90 TABLET | Refills: 0 | Status: SHIPPED | OUTPATIENT
Start: 2023-05-03

## 2023-05-03 NOTE — ASSESSMENT & PLAN NOTE
Currently patient is taking levothyroxine 25 mcg we will continue the same and follow-up with repeat TSH level

## 2023-05-03 NOTE — ASSESSMENT & PLAN NOTE
Since blood pressures are staying around 130/90 on 7 5 mg of amlodipine we will add small dose of diuretic hydrochlorothiazide 12 5 mg and see the response but if she develops any side effects including increasing urination will consider other alternate medications including an ACE inhibitor if necessary to add to amlodipine

## 2023-05-03 NOTE — ASSESSMENT & PLAN NOTE
Patient currently having no problems with regards to the rectal prolapse she is taking Canasa suppository bedtime daily

## 2023-05-03 NOTE — PROGRESS NOTES
Name: Coral Rosas      : 1995      MRN: [id number]  Encounter Provider: Tevin Thurston MD  Encounter Date: 5/3/2023   Encounter department: [facility]     1  Primary hypertension  Assessment & Plan:  Since blood pressures are staying around 130/90 on 7 5 mg of amlodipine we will add small dose of diuretic hydrochlorothiazide 12 5 mg and see the response but if she develops any side effects including increasing urination will consider other alternate medications including an ACE inhibitor if necessary to add to amlodipine  2  Acquired hypothyroidism  Assessment & Plan:  Currently patient is taking levothyroxine 25 mcg we will continue the same and follow-up with repeat TSH level      3  Incomplete rectal prolapse  Assessment & Plan:  Patient currently having no problems with regards to the rectal prolapse she is taking Canasa suppository bedtime daily  4  Generalized anxiety disorder  Assessment & Plan:  Patient with anxiety disorder currently taking Effexor 50 mg daily and is being followed by the psychiatrist every 3 months  Depression Screening and Follow-up Plan: Patient was screened for depression during today's encounter  They screened negative with a PHQ-2 score of 0  Subjective     Patient is coming here for a follow-up evaluation regarding hypertension  Currently she is taking 7 5 mg of amlodipine daily and she has noticed her blood pressure to be around 130/90 on average at home  Patient is on Effexor for her anxiety symptoms which appears to be controlling it very well 50 mg daily  Medications reviewed patient is on Loestrin as a birth control pill and is being followed with a gynecologist   Last hemoglobin A1c was 5 7  Review of Systems   Constitutional: Negative for chills, fatigue and fever  HENT: Negative for ear pain and sore throat  Eyes: Negative for pain and visual disturbance     Respiratory: Negative for cough and shortness of breath  Cardiovascular: Negative for chest pain and palpitations  Gastrointestinal: Negative for abdominal pain, constipation, diarrhea and vomiting  Endocrine: Negative for polydipsia  Genitourinary: Negative for difficulty urinating, dysuria and hematuria  Musculoskeletal: Negative for arthralgias and back pain  Skin: Negative for color change and rash  Neurological: Negative for dizziness, seizures, syncope and headaches  Psychiatric/Behavioral: Negative for agitation, confusion and hallucinations  All other systems reviewed and are negative  Past Medical History:   Diagnosis Date    Anxiety     Depression     Disease of thyroid gland     Ear problems     GERD (gastroesophageal reflux disease)     Hypertension     IBS (irritable bowel syndrome)      Past Surgical History:   Procedure Laterality Date    APPENDECTOMY      COLONOSCOPY       History reviewed  No pertinent family history  Social History     Socioeconomic History    Marital status: Single     Spouse name: None    Number of children: None    Years of education: None    Highest education level: None   Occupational History    None   Tobacco Use    Smoking status: Never    Smokeless tobacco: Never   Substance and Sexual Activity    Alcohol use: Yes    Drug use: No    Sexual activity: Not Currently   Other Topics Concern    None   Social History Narrative    None     Social Determinants of Health     Financial Resource Strain: Not on file   Food Insecurity: Not on file   Transportation Needs: Not on file   Physical Activity: Not on file   Stress: Not on file   Social Connections: Not on file   Intimate Partner Violence: Not on file   Housing Stability: Not on file     Current Outpatient Medications on File Prior to Visit   Medication Sig    amLODIPine (NORVASC) 2 5 mg tablet TAKE 1 TABLET BY MOUTH EVERY DAY    amLODIPine (NORVASC) 5 mg tablet TAKE 1 TABLET (5 MG TOTAL) BY MOUTH DAILY      "ascorbic acid (VITAMIN C) 250 mg tablet Take 250 mg by mouth daily    Bacillus Coagulans-Inulin (ALIGN PREBIOTIC-PROBIOTIC PO) Take by mouth    levothyroxine 25 mcg tablet TAKE 1 TABLET BY MOUTH EVERY DAY    Lo Loestrin Fe 1 MG-10 MCG / 10 MCG TABS     mesalamine (CANASA) 1,000 mg suppository Insert 1 suppository (1,000 mg total) into the rectum daily at bedtime    multivitamin (THERAGRAN) TABS Take 1 tablet by mouth daily    venlafaxine (EFFEXOR) 50 mg tablet Take 50 mg by mouth every morning    [DISCONTINUED] desvenlafaxine succinate (PRISTIQ) 50 mg 24 hr tablet Take 50 mg by mouth daily (Patient not taking: Reported on 5/3/2023)    [DISCONTINUED] docusate sodium (COLACE) 100 mg capsule Take 1 capsule (100 mg total) by mouth 2 (two) times a day (Patient not taking: Reported on 11/2/2022)    [DISCONTINUED] Flaxseed, Linseed, (FLAX SEEDS PO) Take by mouth (Patient not taking: Reported on 5/3/2023)    [DISCONTINUED] norethindrone-ethinyl estradiol-ferrous fumarate (LOESTIN 24 FE) 1-20 MG-MCG(24) per tablet Take 1 tablet by mouth daily (Patient not taking: Reported on 5/3/2023)    [DISCONTINUED] ondansetron (ZOFRAN-ODT) 4 mg disintegrating tablet Take 1 tablet by mouth every 8 (eight) hours as needed for nausea or vomiting for up to 3 days (Patient not taking: Reported on 5/3/2023)     Allergies   Allergen Reactions    Sulfa Antibiotics Hives     Immunization History   Administered Date(s) Administered    Influenza Quadrivalent, 6-35 Months IM 12/23/2013       Objective     /90   Pulse 82   Ht 5' 2\" (1 575 m)   Wt 51 8 kg (114 lb 3 2 oz)   SpO2 98%   BMI 20 89 kg/m²     Physical Exam  Vitals and nursing note reviewed  Constitutional:       Appearance: Normal appearance  She is normal weight  HENT:      Right Ear: External ear normal       Left Ear: External ear normal       Nose: Nose normal  No congestion or rhinorrhea  Mouth/Throat:      Pharynx: Oropharynx is clear   No oropharyngeal " exudate or posterior oropharyngeal erythema  Eyes:      Conjunctiva/sclera: Conjunctivae normal    Cardiovascular:      Rate and Rhythm: Normal rate and regular rhythm  Pulses: Normal pulses  Heart sounds: Normal heart sounds  No murmur heard  Pulmonary:      Effort: Pulmonary effort is normal       Breath sounds: Normal breath sounds  No wheezing or rales  Abdominal:      General: Bowel sounds are normal  There is no distension  Tenderness: There is no abdominal tenderness  Musculoskeletal:         General: No deformity  Normal range of motion  Cervical back: Normal range of motion and neck supple  Right lower leg: No edema  Left lower leg: No edema  Skin:     Coloration: Skin is not pale  Findings: No erythema or rash  Neurological:      Mental Status: She is alert  Sensory: No sensory deficit        Gait: Gait normal    Psychiatric:         Mood and Affect: Mood normal          Judgment: Judgment normal        Jordan Tidwell MD

## 2023-05-03 NOTE — ASSESSMENT & PLAN NOTE
Patient with anxiety disorder currently taking Effexor 50 mg daily and is being followed by the psychiatrist every 3 months

## 2023-05-12 ENCOUNTER — TELEPHONE (OUTPATIENT)
Dept: FAMILY MEDICINE CLINIC | Facility: CLINIC | Age: 28
End: 2023-05-12

## 2023-05-12 NOTE — TELEPHONE ENCOUNTER
Jane called   Having problem with new B/P mediation hydrochlorazide. Feeling very weak . Asked her to go to urgent care and give us a call on Monday.

## 2023-07-03 DIAGNOSIS — E03.9 HYPOTHYROIDISM, UNSPECIFIED TYPE: ICD-10-CM

## 2023-07-03 RX ORDER — LEVOTHYROXINE SODIUM 0.03 MG/1
TABLET ORAL
Qty: 90 TABLET | Refills: 0 | Status: SHIPPED | OUTPATIENT
Start: 2023-07-03

## 2023-08-01 DIAGNOSIS — I10 PRIMARY HYPERTENSION: ICD-10-CM

## 2023-08-01 RX ORDER — HYDROCHLOROTHIAZIDE 12.5 MG/1
12.5 TABLET ORAL DAILY
Qty: 90 TABLET | Refills: 0 | OUTPATIENT
Start: 2023-08-01

## 2023-08-01 RX ORDER — AMLODIPINE BESYLATE 5 MG/1
5 TABLET ORAL DAILY
Qty: 90 TABLET | Refills: 0 | Status: SHIPPED | OUTPATIENT
Start: 2023-08-01 | End: 2023-09-26 | Stop reason: SDUPTHER

## 2023-08-01 RX ORDER — AMLODIPINE BESYLATE 2.5 MG/1
2.5 TABLET ORAL DAILY
Qty: 90 TABLET | Refills: 0 | Status: SHIPPED | OUTPATIENT
Start: 2023-08-01 | End: 2023-09-26 | Stop reason: SDUPTHER

## 2023-09-06 ENCOUNTER — TELEPHONE (OUTPATIENT)
Dept: FAMILY MEDICINE CLINIC | Facility: CLINIC | Age: 28
End: 2023-09-06

## 2023-09-06 ENCOUNTER — TELEMEDICINE (OUTPATIENT)
Dept: INTERNAL MEDICINE CLINIC | Facility: CLINIC | Age: 28
End: 2023-09-06
Payer: COMMERCIAL

## 2023-09-06 VITALS — TEMPERATURE: 100 F | WEIGHT: 114 LBS | BODY MASS INDEX: 20.98 KG/M2 | HEIGHT: 62 IN

## 2023-09-06 DIAGNOSIS — U07.1 COVID-19: Primary | ICD-10-CM

## 2023-09-06 DIAGNOSIS — I10 PRIMARY HYPERTENSION: ICD-10-CM

## 2023-09-06 DIAGNOSIS — E03.9 ACQUIRED HYPOTHYROIDISM: ICD-10-CM

## 2023-09-06 PROCEDURE — 99213 OFFICE O/P EST LOW 20 MIN: CPT | Performed by: INTERNAL MEDICINE

## 2023-09-06 RX ORDER — GUAIFENESIN AND CODEINE PHOSPHATE 100; 10 MG/5ML; MG/5ML
5 SOLUTION ORAL 4 TIMES DAILY PRN
Qty: 180 ML | Refills: 0 | Status: SHIPPED | OUTPATIENT
Start: 2023-09-06

## 2023-09-06 RX ORDER — NIRMATRELVIR AND RITONAVIR 300-100 MG
3 KIT ORAL 2 TIMES DAILY
Qty: 30 TABLET | Refills: 0 | Status: SHIPPED | OUTPATIENT
Start: 2023-09-06 | End: 2023-09-11

## 2023-09-06 NOTE — PATIENT INSTRUCTIONS
COVID-19 and Chronic Health Conditions   WHAT YOU NEED TO KNOW:   Your chronic condition may increase your risk for COVID-19 or serious problems it can cause. Healthcare providers might need to make changes that affect how you usually manage your chronic health condition. Providers may change hours of operation or not have patients come in to be seen. You may not be able to make appointments to get blood drawn or to have tests or procedures. This may continue until the virus that causes COVID-19 is controlled. Until then, you can take steps to manage your condition. The steps will also lower your risk for COVID-19 or the serious problems it causes. If you do develop COVID-19, healthcare providers will tell you when it is okay to be around others after you recover. This depends on your chronic condition, any symptoms of COVID-19 that developed, and how severe the symptoms were. DISCHARGE INSTRUCTIONS:   Call your local emergency number (911 in the [address]) if:   You have trouble breathing or shortness of breath. You have chest pain or pressure that lasts longer than 5 minutes. You become confused or hard to wake. Your lips or face are blue. Seek care immediately if:   You have a fever of 104°F (40°C) or higher. Call your doctor or healthcare provider if:   You have symptoms of COVID-19. You have questions or concerns about COVID-19 or your chronic condition. What you need to know about serious problems from the virus:  You may develop long-term health problems caused by the virus. Your risk is higher if you are 65 or older. A weak immune system, obesity, diabetes, chronic kidney disease, or a heart or lung condition can also increase your risk. Your risk is also higher if you are a current or former cigarette smoker.  COVID-19 can lead to any of the following:  Multisymptom inflammatory syndrome in adults (MIS-A) or in children (MIS-C), causing inflammation in the heart, digestive system, skin, or brain    Shortness of breath, serious lower respiratory conditions, such as pneumonia or acute respiratory distress syndrome (ARDS)    Blood clots or blood vessel damage    Organ damage from a lack of oxygen or from blood clots    Sleep problems    Problems thinking clearly, remembering information, or concentrating    Mood changes, depression, or anxiety    Long-term problems tasting or smelling    Loss of appetite and weight loss    Nerve pain    Fatigue (feeling mentally and physically tired)    How the 2019 coronavirus spreads: The virus spreads quickly and easily. The virus can be passed starting 2 to 3 days before symptoms begin or before a positive test if symptoms never begin. Droplets are the main way all coronaviruses spread. The virus travels in droplets that form when a person talks, sings, coughs, or sneezes. The droplets can also float in the air for minutes or hours. Infection happens when you breathe in the droplets or get them in your eyes or nose. Close personal contact with an infected person increases your risk for infection. This means being within 6 feet (2 meters) of the person for at least 15 minutes over 24 hours. Person-to-person contact can spread the virus. For example, a person with the virus on his or her hands can spread it by shaking hands with someone. The virus can stay on objects and surfaces for up to 3 days. You may become infected by touching the object or surface and then touching your eyes or mouth. What you need to know about the signs and symptoms of COVID-19:  Signs and symptoms usually start about 5 days after infection but can take 2 to 14 days. Signs and symptoms range from mild to severe. You may feel like you have the flu or a bad cold. Your chronic health condition may cause some of the same symptoms COVID-19 causes. This can make it hard to know if a symptom is from COVID-19 or your chronic condition.  Keep a record of any new or worsening symptom you have. This is especially important if you have a condition that often causes shortness of breath. Your provider can tell you if you should be tested for COVID-19. Tell your healthcare provider if you think you were infected but develop signs or symptoms not listed below:  A cough    Shortness of breath or trouble breathing that may become severe    A fever of at least 100.4°F, or 38°C (may be lower in adults 65 or older)    Chills that might include shaking    Muscle pain, body aches, or a headache    A sore throat    Suddenly not being able to taste or smell anything    Feeling very tired (fatigue)    Congestion (stuffy head and nose), or a runny nose    Diarrhea, nausea, or vomiting    Manage your chronic health condition during this time:  If you do not have a regular healthcare provider, experts recommend you contact a local [facility] health center or health department. The following can help you manage your condition and prevent COVID-19:  Get emergency care for your condition if needed. Talk to your healthcare providers about symptoms of your chronic condition that need immediate care. Your providers can help you create a plan or add exacerbation management to your plan. The plan will include when to go to an emergency department and when to call your local emergency number. This will depend on where you live and the services that are available during this time. Go to dialysis appointments as scheduled. It is important to stay on schedule. You will need to have enough food to be able to follow the emergency diet plan if you must miss a session. The emergency diet needs to be part of the management plan for your condition. Reschedule any upcoming appointments as needed. Medical facilities may be closed until the coronavirus is better controlled. This means you may need to reschedule a surgery, procedure, or check-up appointment.  If you cannot have a phone or video appointment, you will need to make a new appointment. Some providers may be scheduling appointments several months in advance. Some surgeries and procedures will happen as scheduled. This depends on the medical condition and the reason for the surgery or procedure. You may need to have extra testing for COVID-19 several days before. Follow any regular management plan you use. Your healthcare provider will tell you if you need to make any changes to your regular management plan. For example, if you have asthma, continue to follow your asthma action plan. If you have diabetes, you may need to check your blood sugar level more often. Stress and illness can make blood sugar levels go up. You may need to adjust medicine such as insulin. If you have a heart condition or high blood pressure, you may need to check your blood pressure more often. Stress and illness can also raise your blood pressure. Talk to your healthcare providers about your medicines. You may be able to get more than 1 month of medicine at a time. This will lower the number of times you need to go to a pharmacy to get your medicines. Make sure you have enough medicine if you have a condition that can lead to an emergency. Examples include asthma medicines, insulin, or an epinephrine pen. Check the expiration dates on the medicines you currently have. Ask for refills as soon as possible, if needed. If it is not time to refill prescriptions, you may be able to get an emergency supply of some medicines. Medicine plans vary, so ask your healthcare provider or pharmacist for options. Have supplies available in your home. If possible, get extra supplies you use regularly. Examples include absorbent pads, syringes, and wound cleaning solutions. This will limit the number of trips out of your home to get supplies. What you can do to prevent having to go out of your home during this time:   Ask your healthcare provider for other ways to have appointments.   Some providers offer phone, video, or other types of appointments. Have food, medicines, and other supplies delivered. Some pharmacies can send certain medicines to you through the mail. Grocery stores and restaurants may be able to deliver food and other items. If possible, have delivered items placed somewhere. Try not to have someone hand you an item. You will be so close to the person that the virus can spread between you. Ask someone to get items you need. The person can get groceries, medicines, or other needed items for you. Choose a person who does not have symptoms of COVID-19 or has tested negative for it. The person should not be waiting for test results. He or she should not have a condition that increases the risk for COVID-19 or serious problems it causes. What you need to know about COVID-19 vaccines: Your healthcare provider can give you more information about what to expect, depending on your chronic condition. You are considered fully vaccinated against COVID-19 two weeks after the final dose of any COVID-19 vaccine. Let your healthcare provider know when you have received the final dose of the vaccine. Make a copy of your vaccination card. Keep the original with you in case you need to show it. Keep the copy in a safe place. Get a COVID-19 vaccine as directed. Vaccination is recommended for everyone 6 months or older. COVID-19 vaccines are given as a shot in 1 to 3 doses as a primary series. This depends on the vaccine brand and the age of the person who receives it. A booster dose is recommended for everyone 5 years or older after the primary series is complete. A second booster  is recommended for all adults 48 or older and for immunocompromised adolescents. The second booster is also recommended for anyone who got the 1-dose brand of vaccine for the first dose and a booster. Your provider can give you more information on boosters and help you schedule all needed doses.          Continue social distancing and other measures. You can become infected even after you get the vaccine. You may also be able to pass the virus to others without knowing you are infected. After you get the vaccine, check local, national, and international travel rules. You may need to be tested before you travel. Some countries require proof of a negative test before you travel. You may also need to quarantine after you return. Medicine may be given to prevent infection. The medicine can be given if you are at high risk for infection and cannot get the vaccine. It can also be given if your immune system does not respond well to the vaccine. Lower your risk for COVID-19:   Wash your hands often throughout the day. Use soap and water. Rub your soapy hands together, lacing your fingers, for at least 20 seconds. Rinse with warm, running water. Dry your hands with a clean towel or paper towel. Use hand  that contains alcohol if soap and water are not available. Teach children how to wash their hands and use hand . Cover sneezes and coughs. Turn your face away and cover your mouth and nose with a tissue. Throw the tissue away. Use the bend of your arm if a tissue is not available. Then wash your hands with soap and water or use hand . Teach children how to cover a cough or sneeze. Follow worldwide, national, and local social distancing guidelines. Keep at least 6 feet (2 meters) between you and others. Wear a face covering (mask) around anyone who does not live in your home. Use a cloth covering with at least 2 layers. You can also create layers by putting a cloth covering over a disposable non-medical mask. Cover your mouth and your nose. Try not to touch your face. If you get the virus on your hands, you can transfer it to your eyes, nose, or mouth and become infected. You can also transfer it to objects, surfaces, or people.     Clean and disinfect high-touch surfaces and objects in your home often. Use disinfecting wipes, or make a solution by mixing 4 teaspoons of bleach with 1 quart (4 cups) of water. Do not  use any cleaning or disinfecting products that can trigger an asthma attack or other breathing problems. Open windows or have circulating air as you clean. Do not  mix ammonia with bleach. This will create toxic fumes. How to follow social distancing guidelines:  National and local social distancing rules vary. Rules and restrictions may change over time as restrictions are lifted. The following are general guidelines:  Stay home if you are sick or think you may have COVID-19. It is important to stay home if you are waiting for a testing appointment or for test results. Avoid close physical contact with anyone who does not live in your home. Do not shake hands with, hug, or kiss a person as a greeting. If you must use public transportation (such as a bus or subway), try to sit or stand away from others. Wear your face covering. Avoid in-person gatherings and crowds. Attend virtually if possible. Help strengthen your immune system:   Ask about other vaccines you may need. Get the influenza (flu) vaccine as soon as recommended each year, usually starting in September or October. Get the pneumonia vaccine if recommended. Your healthcare provider can tell you if you should also get other vaccines, and when to get them. Do not smoke. Nicotine and other chemicals in cigarettes and cigars can increase your risk for infection and for serious COVID-19 effects. Ask your healthcare provider for information if you currently smoke and need help to quit. E-cigarettes or smokeless tobacco still contain nicotine. Talk to your healthcare provider before you use these products. Eat a variety of healthy foods. Examples include vegetables, fruits, whole-grain breads and cereals, lean meats and poultry, fish, low-fat dairy products, and cooked beans.  Healthy foods contain nutrients that help keep your immune system strong. Find ways to manage stress. You may be feeling more stressed than usual because of the COVID-19 outbreak. The situation is very stressful to many people. Talk to your healthcare providers about ways to manage stress during this time. Stress can lead to breathing problems or trigger an attack or exacerbation of many health conditions. Pick 1 or 2 times a day to watch the news. Constant news watching can increase your stress levels. Instead, do things you enjoy. Talk to a friend or go for a walk together. Read a book or magazine. Take a warm bath or listen to soothing music. Follow up with your doctor or healthcare provider as directed: Your providers will tell you when you can come in for tests, procedures, or check-ups. Bring your symptom record with you to all appointments. Write down your questions so you remember to ask them during your visits. For more information:   [facility]  [address]  [place]  Phone: 1- 058 - 1862249  Phone: 6- 347 - 6191296  Web Address: [employer]    © Copyright [employer] 2022 Information is for End User's use only and may not be sold, redistributed or otherwise used for commercial purposes. The above information is an  only. It is not intended as medical advice for individual conditions or treatments. Talk to your doctor, nurse or pharmacist before following any medical regimen to see if it is safe and effective for you.

## 2023-09-06 NOTE — ASSESSMENT & PLAN NOTE
Today symptoms coughing scanty expectoration nasal congestion sore throat headache body aches low-grade fever no chest pain no difficulty breathing no other associate symptoms treated with Paxlovid explained the side effect at length also vitamin C vitamin D and zinc and other supportive symptomatic treatment including cough medicine with codeine and given the instruction as instructed in discharge instructions  Educated about if any worsening of the symptoms with difficulty breathing chest pain persistent nausea vomiting intractable headache or high fever should go to the emergency room

## 2023-09-06 NOTE — LETTER
September 6, 2023     Patient: Romario Barfield  YOB: 1995  Date of Visit: 9/6/2023      To Whom it May Concern:    Romario Barfield is under my professional care. Warren Mote was seen in my office on 9/6/2023. If you have any questions or concerns, please don't hesitate to call.          Sincerely,          Lauren De León MD        CC: No Recipients

## 2023-09-06 NOTE — PROGRESS NOTES
COVID-19 Outpatient Progress Note    Assessment/Plan:    Problem List Items Addressed This Visit        Endocrine    Hypothyroidism     Continue Synthroid current dosage symptoms controlled            Cardiovascular and Mediastinum    HTN (hypertension)     Continue low-salt diet monitoring blood pressure at home very well controlled            Other    COVID-19 - Primary     Today symptoms coughing scanty expectoration nasal congestion sore throat headache body aches low-grade fever no chest pain no difficulty breathing no other associate symptoms treated with Paxlovid explained the side effect at length also vitamin C vitamin D and zinc and other supportive symptomatic treatment including cough medicine with codeine and given the instruction as instructed in discharge instructions  Educated about if any worsening of the symptoms with difficulty breathing chest pain persistent nausea vomiting intractable headache or high fever should go to the emergency room         Relevant Medications    nirmatrelvir & ritonavir (Paxlovid, 300/100,) tablet therapy pack    guaifenesin-codeine (GUAIFENESIN AC) 100-10 MG/5ML liquid      Disposition:     Discussed symptom directed medication options with patient. Discussed vitamin D, vitamin C, and/or zinc supplementation with patient. Patient meets criteria for PAXLOVID and they have been counseled appropriately according to EUA documentation released by the FDA. After discussion, patient agrees to treatment. Jag Sabrina is an investigational medicine used to treat mild-to-moderate COVID-19 in adults and children (15years of age and older weighing at least 80 pounds (40 kg)) with positive results of direct SARS-CoV-2 viral testing, and who are at high risk for progression to severe COVID-19, including hospitalization or death. PAXLOVID is investigational because it is still being studied.  There is limited information about the safety and effectiveness of using PAXLOVID to treat people with mild-to-moderate COVID-19. The FDA has authorized the emergency use of PAXLOVID for the treatment of mild-tomoderate COVID-19 in adults and children (15years of age and older weighing at least 80 pounds (40 kg)) with a positive test for the virus that causes COVID-19, and who are at high risk for progression to severe COVID-19, including hospitalization or death, under an EUA. What should I tell my healthcare provider before I take PAXLOVID? Tell your healthcare provider if you:  - Have any allergies  - Have liver or kidney disease  - Are pregnant or plan to become pregnant  - Are breastfeeding a child  - Have any serious illnesses    Tell your healthcare provider about all the medicines you take, including prescription and over-the-counter medicines, vitamins, and herbal supplements. Some medicines may interact with PAXLOVID and may cause serious side effects. Keep a list of your medicines to show your healthcare provider and pharmacist when you get a new medicine. You can ask your healthcare provider or pharmacist for a list of medicines that interact with PAXLOVID. Do not start taking a new medicine without telling your healthcare provider. Your healthcare provider can tell you if it is safe to take PAXLOVID with other medicines. Tell your healthcare provider if you are taking combined hormonal contraceptive. PAXLOVID may affect how your birth control pills work. Females who are able to become pregnant should use another effective alternative form of contraception or an additional barrier method of contraception. Talk to your healthcare provider if you have any questions about contraceptive methods that might be right for you. How do I take PAXLOVID? PAXLOVID consists of 2 medicines: nirmatrelvir and ritonavir. - Take 2 pink tablets of nirmatrelvir with 1 white tablet of ritonavir by mouth 2 times each day (in the morning and in the evening) for 5 days.  For each dose, take all 3 tablets at the same time. - If you have kidney disease, talk to your healthcare provider. You may need a different dose. - Swallow the tablets whole. Do not chew, break, or crush the tablets  - Take PAXLOVID with or without food. - Do not stop taking PAXLOVID without talking to your healthcare provider, even if you feel better. - If you miss a dose of PAXLOVID within 8 hours of the time it is usually taken, take it as soon as you remember. If you miss a dose by more than 8 hours, skip the missed dose and take the next dose at your regular time. Do not take 2 doses of PAXLOVID at the same time. - If you take too much PAXLOVID, call your healthcare provider or go to the nearest hospital emergency room right away. - If you are taking a ritonavir- or cobicistat-containing medicine to treat hepatitis C or Human Immunodeficiency Virus (HIV), you should continue to take your medicine as prescribed by your healthcare provider.  - Talk to your healthcare provider if you do not feel better or if you feel worse after 5 days. Who should generally not take PAXLOVID? Do not take PAXLOVID if:  You are allergic to nirmatrelvir, ritonavir, or any of the ingredients in PAXLOVID. You are taking any of the following medicines:  - Alfuzosin  - Pethidine, piroxicam, propoxyphene  - Ranolazine  - Amiodarone, dronedarone, flecainide, propafenone, quinidine  - Colchicine  - Lurasidone, pimozide, clozapine  - Dihydroergotamine, ergotamine, methylergonovine  - Lovastatin, simvastatin  - Sildenafil (Revatio®) for pulmonary arterial hypertension (PAH)  - Triazolam, oral midazolam  - Apalutamide  - Carbamazepine, phenobarbital, phenytoin  - Rifampin  - Tamara’s Wort (hypericum perforatum)    What are the important possible side effects of PAXLOVID? Possible side effects of PAXLOVID are:  - Liver Problems.  Tell your healthcare provider right away if you have any of these signs and symptoms of liver problems: loss of appetite, yellowing of your skin and the whites of eyes (jaundice), dark-colored urine, pale colored stools and itchy skin, stomach area (abdominal) pain. - Resistance to HIV Medicines. If you have untreated HIV infection, PAXLOVID may lead to some HIV medicines not working as well in the future. - Other possible side effects include: altered sense of taste, diarrhea, high blood pressure, or muscle aches    These are not all the possible side effects of PAXLOVID. Not many people have taken PAXLOVID. Serious and unexpected side effects may happen. Di Zellwood is still being studied, so it is possible that all of the risks are not known at this time. What other treatment choices are there? Like Lambert [employer]ut[employer] may allow for the emergency use of other medicines to treat people with COVID-19. Go to https://[employer]er[employer]d/ for information on the emergency use of other medicines that are authorized by FDA to treat people with COVID-19. Your healthcare provider may talk with you about clinical trials for which you may be eligible. It is your choice to be treated or not to be treated with PAXLOVID. Should you decide not to receive it or for your child not to receive it, it will not change your standard medical care. What if I am pregnant or breastfeeding? There is no experience treating pregnant women or breastfeeding mothers with PAXLOVID. For a mother and unborn baby, the benefit of taking PAXLOVID may be greater than the risk from the treatment. If you are pregnant, discuss your options and specific situation with your healthcare provider. It is recommended that you use effective barrier contraception or do not have sexual activity while taking PAXLOVID. If you are breastfeeding, discuss your options and specific situation with your healthcare provider.      How do I report side effects with PAXLOVID? Contact your healthcare provider if you have any side effects that bother you or do not go away. Report side effects to FDA MedWatch at www.fda.gov/medwatch or call 1-960-MVZ1500 or you can report side effects to [facility]. at the contact information provided below. Website Fax number Telephone number   [employer] [phone]     How should I store Xiomy Crowell? Store PAXLOVID tablets at room temperature between 68°F to 77°F (20°C to 25°C). Full fact sheet for patients, parents, and caregivers can be found at: [employer]-8.co.za    I have spent a total time of 15 minutes on the day of the encounter for this patient including discussing diagnostic results, discussing prognosis, risks and benefits of treatment options, instructions for management, patient and family education and counseling/coordination of care. Encounter provider: Brady Acosta MD     Provider located at: [facility] INTERNAL MEDICINE  [address] [facility]  [address] [place] [address] [id number] [place] [address]     Recent Visits  No visits were found meeting these conditions. Showing recent visits within past 7 days and meeting all other requirements  Today's Visits  Date Type Provider Dept   09/06/23 Telemedicine Brady Acosta MD [facility] Internal Med   Showing today's visits and meeting all other requirements  Future Appointments  No visits were found meeting these conditions. Showing future appointments within next 150 days and meeting all other requirements       Patient agrees to participate in a virtual check in via telephone or video visit instead of presenting to the office to address urgent/immediate medical needs. Patient is aware this is a billable service. She acknowledged consent and understanding of privacy and security of the video platform.  The patient has agreed to participate and understands they can discontinue the visit at any time. After connecting through [facility], the patient was identified by name and date of birth. Raimundo Nino was informed that this was a telemedicine visit and that the exam was being conducted confidentially over secure lines. My office door was closed. No one else was in the room. Raimundo Nino acknowledged consent and understanding of privacy and security of the telemedicine visit. I informed the patient that I have reviewed her record in Epic and presented the opportunity for her to ask any questions regarding the visit today. The patient agreed to participate. Verification of patient location:  Patient is located in the following state in which I hold an active license: [place]    Subjective:   Raimundo Nino is a 29 y.o. female who is concerned about COVID-19. Patient's symptoms include fever, chills, nasal congestion, rhinorrhea and cough. - Date of symptom onset: 9/5/2023      COVID-19 vaccination status: Fully vaccinated with booster    Exposure:   Contact with a person who is under investigation (PUI) for or who is positive for COVID-19 within the last 14 days?: No    Hospitalized recently for fever and/or lower respiratory symptoms?: No      Currently a healthcare worker that is involved in direct patient care?: No      Works in a special setting where the risk of COVID-19 transmission may be high? (this may include long-term care, correctional and nursing home facilities; homeless shelters; assisted-living facilities and group homes.): No      Resident in a special setting where the risk of COVID-19 transmission may be high? (this may include long-term care, correctional and nursing home facilities; homeless shelters; assisted-living facilities and group homes.): No      Lab Results   Component Value Date    SARSCOV2 Negative 06/01/2021       Review of Systems   Constitutional: Positive for chills and fever. HENT: Positive for congestion and rhinorrhea.     Respiratory: Positive for cough. Musculoskeletal: Positive for arthralgias. Current Outpatient Medications on File Prior to Visit   Medication Sig   • amLODIPine (NORVASC) 2.5 mg tablet TAKE 1 TABLET BY MOUTH EVERY DAY   • amLODIPine (NORVASC) 5 mg tablet TAKE 1 TABLET (5 MG TOTAL) BY MOUTH DAILY. • ascorbic acid (VITAMIN C) 250 mg tablet Take 250 mg by mouth daily   • Bacillus Coagulans-Inulin (ALIGN PREBIOTIC-PROBIOTIC PO) Take by mouth   • levothyroxine 25 mcg tablet TAKE 1 TABLET BY MOUTH EVERY DAY   • Lo Loestrin Fe 1 MG-10 MCG / 10 MCG TABS    • mesalamine (CANASA) 1,000 mg suppository Insert 1 suppository (1,000 mg total) into the rectum daily at bedtime   • multivitamin (THERAGRAN) TABS Take 1 tablet by mouth daily   • venlafaxine (EFFEXOR) 50 mg tablet Take 50 mg by mouth every morning   • hydrochlorothiazide (HYDRODIURIL) 12.5 mg tablet Take 1 tablet (12.5 mg total) by mouth daily (Patient not taking: Reported on 9/6/2023)       Objective:    Temp 100 °F (37.8 °C)   Ht 5' 2" (1.575 m)   Wt 51.7 kg (114 lb)   BMI 20.85 kg/m²        Physical Exam  Neurological:      Mental Status: She is oriented to person, place, and time.        Sonja Kaye MD

## 2023-09-26 DIAGNOSIS — I10 PRIMARY HYPERTENSION: ICD-10-CM

## 2023-09-26 RX ORDER — AMLODIPINE BESYLATE 2.5 MG/1
2.5 TABLET ORAL DAILY
Qty: 90 TABLET | Refills: 0 | Status: SHIPPED | OUTPATIENT
Start: 2023-09-26

## 2023-09-26 RX ORDER — AMLODIPINE BESYLATE 5 MG/1
5 TABLET ORAL DAILY
Qty: 90 TABLET | Refills: 0 | Status: SHIPPED | OUTPATIENT
Start: 2023-09-26

## 2023-09-26 NOTE — TELEPHONE ENCOUNTER
Reason for call:   [x] Refill   [] Prior Auth  [] Other:     Office:   [x] PCP/Provider -   [] Speciality/Provider -     Medication: Amlodipine    Dose/Frequency: 2.5 mg    Quantity: #90    [facility]    Does the patient have enough for 3 days? [x] Yes   [] No - Send as HP to POD        Reason for call:   [x] Refill   [] Prior Auth  [] Other:     Office:   [x] PCP/Provider -   [] Speciality/Provider -     Medication: Amlodipine    Dose/Frequency: 5 mg     Quantity: #90    Pharmacy: [facility] [id number] [facility]     Does the patient have enough for 3 days?    [x] Yes   [] No - Send as HP to POD

## 2023-09-27 NOTE — TELEPHONE ENCOUNTER
In the process of changing gynecologist.  Can Dr. Rosangela Stauffer please refill this medication.  Thank you

## 2023-10-13 RX ORDER — NORETHINDRONE ACETATE AND ETHINYL ESTRADIOL, ETHINYL ESTRADIOL AND FERROUS FUMARATE 1MG-10(24)
KIT ORAL
Qty: 84 TABLET | Refills: 0 | Status: CANCELLED | OUTPATIENT
Start: 2023-10-13

## 2023-10-20 DIAGNOSIS — E03.9 HYPOTHYROIDISM, UNSPECIFIED TYPE: ICD-10-CM

## 2023-10-20 RX ORDER — LEVOTHYROXINE SODIUM 0.03 MG/1
TABLET ORAL
Qty: 90 TABLET | Refills: 0 | Status: SHIPPED | OUTPATIENT
Start: 2023-10-20

## 2023-11-09 DIAGNOSIS — I10 PRIMARY HYPERTENSION: ICD-10-CM

## 2023-11-09 RX ORDER — AMLODIPINE BESYLATE 5 MG/1
5 TABLET ORAL DAILY
Qty: 90 TABLET | Refills: 0 | Status: SHIPPED | OUTPATIENT
Start: 2023-11-09

## 2023-11-09 RX ORDER — AMLODIPINE BESYLATE 2.5 MG/1
2.5 TABLET ORAL DAILY
Qty: 90 TABLET | Refills: 0 | Status: SHIPPED | OUTPATIENT
Start: 2023-11-09

## 2023-11-10 ENCOUNTER — TELEPHONE (OUTPATIENT)
Age: 28
End: 2023-11-10

## 2023-11-10 NOTE — TELEPHONE ENCOUNTER
The patient call to request her blood work order to be email to her at [email or web address]  The patient is schedule 01/04/2024

## 2023-11-27 ENCOUNTER — TELEPHONE (OUTPATIENT)
Age: 28
End: 2023-11-27

## 2023-11-27 NOTE — TELEPHONE ENCOUNTER
Patients GI provider:  Dr. Marcello Shelton    Number to return call: [phone]    Reason for call Failed OA - age 29 yrs    Scheduled procedure/appointment date if applicable: Appt 7/83/27 11/27/23  Screened by: Elizabeth Perez    Referring Provider     Pre- Screening: There is no height or weight on file to calculate BMI. Has patient been referred for a routine screening Colonoscopy? yes  Is the patient between 43-73 years old? no -  28 yrs       Previous Colonoscopy    If yes:    Date:     Facility:     Reason:       SCHEDULING STAFF: If the patient is between 39yrs-51yrs, please advise patient to confirm benefits/coverage with their insurance company for a routine screening colonoscopy, some insurance carriers will only cover at [address] [place] [address] or older. If the patient is over 66years old, please schedule an office visit. Does the patient want to see a Gastroenterologist prior to their procedure OR are they having any GI symptoms no    Has the patient been hospitalized or had abdominal surgery in the past 6 months? no    Does the patient use supplemental oxygen? no    Does the patient take Coumadin, Lovenox, Plavix, Elliquis, Xarelto, or other blood thinning medication? no    Has the patient had a stroke, cardiac event, or stent placed in the past year? no    SCHEDULING STAFF: If patient answers NO to above questions, then schedule procedure. If patient answers YES to above questions, then schedule office appointment. If patient is between 45yrs - 49yrs, please advise patient that we will have to confirm benefits & coverage with their insurance company for a routine screening colonoscopy.

## 2023-12-07 ENCOUNTER — TELEPHONE (OUTPATIENT)
Age: 28
End: 2023-12-07

## 2023-12-07 NOTE — TELEPHONE ENCOUNTER
Scheduled date of colonoscopy (as of today):06/14/2024  Physician performing colonoscopy:Sebastian  Location of colonoscopy:[facility]  Bowel prep reviewed with patient:FIDE/HORTENCIA sent to [facility]  Instructions reviewed with patient by:clemencia  Clearances: N/A    : Lan Reddy [phone]

## 2023-12-07 NOTE — TELEPHONE ENCOUNTER
12/07/23  Screened by: Babatunde Lewis    Referring Provider Chong Alstno  Pre- Screening: There is no height or weight on file to calculate BMI.20.85   Has patient been referred for a routine screening Colonoscopy? yes  Is the patient between 43-73 years old? yes      Previous Colonoscopy yes   If yes:    Date: 2021    Facility: [address]    Reason:       SCHEDULING STAFF: If the patient is between 39yrs-51yrs, please advise patient to confirm benefits/coverage with their insurance company for a routine screening colonoscopy, some insurance carriers will only cover at [address] [place] [address] or older. If the patient is over 66years old, please schedule an office visit. Does the patient want to see a Gastroenterologist prior to their procedure OR are they having any GI symptoms? no    Has the patient been hospitalized or had abdominal surgery in the past 6 months? no    Does the patient use supplemental oxygen? no    Does the patient take Coumadin, Lovenox, Plavix, Elliquis, Xarelto, or other blood thinning medication? no    Has the patient had a stroke, cardiac event, or stent placed in the past year? no    SCHEDULING STAFF: If patient answers NO to above questions, then schedule procedure. If patient answers YES to above questions, then schedule office appointment. If patient is between 45yrs - 49yrs, please advise patient that we will have to confirm benefits & coverage with their insurance company for a routine screening colonoscopy.

## 2023-12-18 DIAGNOSIS — E03.9 HYPOTHYROIDISM, UNSPECIFIED TYPE: ICD-10-CM

## 2023-12-18 DIAGNOSIS — I10 PRIMARY HYPERTENSION: ICD-10-CM

## 2023-12-18 RX ORDER — AMLODIPINE BESYLATE 5 MG/1
5 TABLET ORAL DAILY
Qty: 90 TABLET | Refills: 1 | Status: SHIPPED | OUTPATIENT
Start: 2023-12-18

## 2023-12-18 RX ORDER — AMLODIPINE BESYLATE 2.5 MG/1
2.5 TABLET ORAL DAILY
Qty: 90 TABLET | Refills: 1 | Status: SHIPPED | OUTPATIENT
Start: 2023-12-18

## 2023-12-18 RX ORDER — LEVOTHYROXINE SODIUM 0.03 MG/1
25 TABLET ORAL DAILY
Qty: 90 TABLET | Refills: 1 | Status: SHIPPED | OUTPATIENT
Start: 2023-12-18

## 2024-01-02 ENCOUNTER — TELEPHONE (OUTPATIENT)
Dept: OTHER | Facility: OTHER | Age: 29
End: 2024-01-02

## 2024-01-02 NOTE — TELEPHONE ENCOUNTER
Patient is calling regarding cancelling an appointment.    Date/Time:1/04/2023/10:40 a.m.    Patient was rescheduled: YES [] NO [x]    Patient requesting call back to reschedule: YES [] NO [x]

## 2024-01-03 ENCOUNTER — TELEPHONE (OUTPATIENT)
Age: 29
End: 2024-01-03

## 2024-01-19 ENCOUNTER — TELEPHONE (OUTPATIENT)
Age: 29
End: 2024-01-19

## 2024-01-19 NOTE — TELEPHONE ENCOUNTER
Patients GI provider:  Dr. Cortes    Reason for call: Pt's mother calling to check on appointment details, there is not an up to date communications form on file so the mother was instructed to have the pt call the office directly to review appointment details.     Scheduled procedure/appointment date if applicable: 6/14/2024 - Dr. Cortes - Colonoscopy

## 2024-02-28 ENCOUNTER — TELEPHONE (OUTPATIENT)
Dept: GASTROENTEROLOGY | Facility: CLINIC | Age: 29
End: 2024-02-28

## 2024-02-28 NOTE — TELEPHONE ENCOUNTER
Scheduled date of EGD/colonoscopy (as of today): 06/05/2024  Physician performing EGD/colonoscopy: Dr. Cortes  Location of EGD/colonoscopy: [facility]  Desired bowel prep reviewed with patient: FIDE/HORTENCIA  Pt has prep instructions from previously sched proced

## 2024-02-28 NOTE — TELEPHONE ENCOUNTER
I lmom informing pt that the [facility] are expanding their GI services and due to this we need to reschedule her procedure on 6/11/24.  I asked pt to please call me back to reschedule.  There is openings on 6/11/24 at [facility] that same date.  If pt calls back, please offer that to her. Thank you so much!

## 2024-03-10 ENCOUNTER — NURSE TRIAGE (OUTPATIENT)
Dept: OTHER | Facility: OTHER | Age: 29
End: 2024-03-10

## 2024-03-10 NOTE — TELEPHONE ENCOUNTER
Pt recommended to go to ER for eval at this time. Pt refusing, wants to speak with on call provider. Provider contacted. On call provider agrees, pt needs to be seen in ER at this time. Pt verbalized understanding.

## 2024-03-10 NOTE — TELEPHONE ENCOUNTER
"Reason for Disposition  • Patient sounds very sick or weak to the triager    Answer Assessment - Initial Assessment Questions  1. CONCERN: \"What happened that made you call today?\"      Having a panic attack, requesting xanax and effexor    2. ANXIETY SYMPTOM SCREENING: \"Can you describe how you have been feeling?\"  (e.g., tense, restless, panicky, anxious, keyed up, trouble sleeping, trouble concentrating)      Can't sit still    3. ONSET: \"How long have you been feeling this way?\"      About an hour ago, comes in waves    4. RECURRENT: \"Have you felt this way before?\"  If Yes, ask: \"What happened that time?\" \"What helped these feelings go away in the past?\"       Yes, reoccurring symptoms    5. RISK OF HARM - SUICIDAL IDEATION:  \"Do you ever have thoughts of hurting or killing yourself?\"  (e.g., yes, no, no but preoccupation with thoughts about death)    - INTENT:  \"Do you have thoughts of hurting or killing yourself right NOW?\" (e.g., yes, no, N/A)    - PLAN: \"Do you have a specific plan for how you would do this?\" (e.g., gun, knife, overdose, no plan, N/A)      Denies    6. RISK OF HARM - HOMICIDAL IDEATION:  \"Do you ever have thoughts of hurting or killing someone else?\"  (e.g., yes, no, no but preoccupation with thoughts about death)    - INTENT:  \"Do you have thoughts of hurting or killing someone right NOW?\" (e.g., yes, no, N/A)    - PLAN: \"Do you have a specific plan for how you would do this?\" (e.g., gun, knife, no plan, N/A)       Denies    7. SUPPORT: \"Who is with you now?\" \"Who do you live with?\" \"Do you have family or friends who you can talk to?\"       With mom now    8. STRESSORS: \"Has there been any new stress or recent changes in your life?\"        Find out tomorrow if fired from work    9. CAFFEINE USE: \"Do you drink caffeinated beverages, and how much each day?\" (e.g., coffee, tea, ellis)        Denies    10. ALCOHOL USE OR SUBSTANCE USE (DRUG USE): \"Do you drink alcohol or use any illegal " "drugs?\"        Denies    11. OTHER SYMPTOMS: \"Do you have any other physical symptoms right now?\" (e.g., chest pain, palpitations, difficulty breathing, fever)        Mouth is dry, blood rushing to my head, tingling extremities,  can't sit still, palpitations. Can't take a full breath in.     Protocols used: Anxiety and Panic Attack-ADULT-AH    "

## 2024-03-10 NOTE — TELEPHONE ENCOUNTER
Regarding: Effexor refill/sent to [facility]  ----- Message from Luz Ramírez sent at 3/10/2024 10:36 AM EDT -----  Medication Refill Request     Name Effexor   Dose/Frequency 50mg/Take 50 mg by mouth every morning  Quantity n/a  Verified pharmacy   [ x]  Verified ordering Provider   [x ]  Does patient have enough for the next 3 days? Yes [ ] No [x ]       Pt would also like to know if an emergency 2.5mg xanax can be sent as she is having a panic attack.

## 2024-03-27 ENCOUNTER — TELEPHONE (OUTPATIENT)
Age: 29
End: 2024-03-27

## 2024-03-27 NOTE — TELEPHONE ENCOUNTER
Patients GI provider:  Dr. Cortes    Number to return call: [phone]    Reason for call: Pt called for an earlier appt but call was disconnected    Scheduled procedure/appointment date if applicable: procedure 09/27/24

## 2024-05-30 ENCOUNTER — TELEPHONE (OUTPATIENT)
Dept: OTHER | Facility: OTHER | Age: 29
End: 2024-05-30

## 2024-05-30 DIAGNOSIS — K62.6 ULCER OF ANUS AND RECTUM: ICD-10-CM

## 2024-05-30 DIAGNOSIS — I10 PRIMARY HYPERTENSION: ICD-10-CM

## 2024-05-30 DIAGNOSIS — E03.9 HYPOTHYROIDISM, UNSPECIFIED TYPE: ICD-10-CM

## 2024-05-30 DIAGNOSIS — Z13.29 SCREENING FOR THYROID DISORDER: Primary | ICD-10-CM

## 2024-05-30 RX ORDER — LEVOTHYROXINE SODIUM 0.03 MG/1
25 TABLET ORAL DAILY
Qty: 90 TABLET | Refills: 1 | Status: SHIPPED | OUTPATIENT
Start: 2024-05-30

## 2024-05-30 RX ORDER — AMLODIPINE BESYLATE 2.5 MG/1
2.5 TABLET ORAL DAILY
Qty: 90 TABLET | Refills: 1 | Status: SHIPPED | OUTPATIENT
Start: 2024-05-30

## 2024-05-30 RX ORDER — MESALAMINE 1000 MG/1
1000 SUPPOSITORY RECTAL
Qty: 90 SUPPOSITORY | Refills: 2 | Status: SHIPPED | OUTPATIENT
Start: 2024-05-30

## 2024-05-30 RX ORDER — AMLODIPINE BESYLATE 5 MG/1
5 TABLET ORAL DAILY
Qty: 90 TABLET | Refills: 1 | Status: SHIPPED | OUTPATIENT
Start: 2024-05-30

## 2024-05-30 NOTE — TELEPHONE ENCOUNTER
Patient asked if Dr. Teague would order TSH blood work only due to weight gain concern. Patient stated that her new insurance will start working in 2 weeks, then she will be able to do a full annual blood work. Patient's annual exam is scheduled for  8/6/24. Please let the patient know if TSH is ordered.

## 2024-05-30 NOTE — TELEPHONE ENCOUNTER
PT requesting 90 day supply of meds:  Amlodipine 2.5mg  Amlodipine 5mg  Levothyroxine 25mcg  Mesalamine 1000mg    Thank you

## 2024-06-18 DIAGNOSIS — I10 PRIMARY HYPERTENSION: ICD-10-CM

## 2024-06-19 RX ORDER — AMLODIPINE BESYLATE 5 MG/1
5 TABLET ORAL DAILY
Qty: 90 TABLET | Refills: 0 | Status: SHIPPED | OUTPATIENT
Start: 2024-06-19

## 2024-08-05 ENCOUNTER — TELEPHONE (OUTPATIENT)
Dept: FAMILY MEDICINE CLINIC | Facility: CLINIC | Age: 29
End: 2024-08-05

## 2024-08-05 DIAGNOSIS — Z13.29 SCREENING FOR THYROID DISORDER: ICD-10-CM

## 2024-08-05 DIAGNOSIS — Z13.0 SCREENING FOR DEFICIENCY ANEMIA: ICD-10-CM

## 2024-08-05 DIAGNOSIS — R73.03 PRE-DIABETES: Primary | ICD-10-CM

## 2024-08-05 DIAGNOSIS — E78.5 DYSLIPIDEMIA: ICD-10-CM

## 2024-08-26 ENCOUNTER — RA CDI HCC (OUTPATIENT)
Dept: OTHER | Facility: HOSPITAL | Age: 29
End: 2024-08-26

## 2024-12-02 ENCOUNTER — TELEPHONE (OUTPATIENT)
Age: 29
End: 2024-12-02

## 2024-12-02 NOTE — TELEPHONE ENCOUNTER
Patients GI provider:  Dr. Cortes    Number to return call: (: [phone]     Reason for call: Pt mother calling to schedule a colonoscopy, OA would fail due to age, but there is a recall  and an order that expires in Dec ( the request is for a colonoscopy scheduled March 9th-16th) however the patient has not been seen in 2022, please contact the patient to confirm if she can have the colonoscopy scheduled or an OV needs to be done.     Scheduled procedure/appointment date if applicable:

## 2024-12-04 NOTE — TELEPHONE ENCOUNTER
Patients GI provider:  PREVIOUSLY DR DAWN      Number to return call: (: [phone]      Reason for call: Pt mother calling to schedule a colonoscopy, OA would fail due to age, but there is a recall  and an order that expires in Dec ( pt requesting Colonoscopy for March 9th-16th) however the patient has not been seen since 2022, please contact the patient to confirm if she can have the colonoscopy scheduled or an OV needs to be done.    Patient prefers to be directly scheduled for procedure. Please return patients call.

## 2024-12-04 NOTE — TELEPHONE ENCOUNTER
I lmom for pt to please call back to reschedule the colonoscopy.  If pt calls back, please reschedule. Thank you!

## 2024-12-19 NOTE — PROGRESS NOTES
Office Visit Note  24     Jane Hanks 29 y.o. female MRN: [id number]  : 1995    Assessment:     1. Primary hypertension  Assessment & Plan:  Blood pressure today is 131/70 4 repeat 1 also came around the same currently taking amlodipine 10 mg daily he was on a lower dose but the pressures were running high and the dosage was adjusted  Orders:  -     amLODIPine (NORVASC) 10 mg tablet; Take 1 tablet (10 mg total) by mouth daily  2. Generalized anxiety disorder  Assessment & Plan:  Patient with anxiety and depression being followed by the psychiatrist currently she is on fluoxetine 10 mg daily along with Effexor 100 mg daily she was on 50 before the dosage was adjusted.  Again discussed at length regarding stress management and anxiety management depression management with the patient recommended to stay positive and stay focused  3. Hypothyroidism, unspecified type  Assessment & Plan:  Currently patient is taking levothyroxine 25 mcg daily we will continue with the same last TSH level was normal.  Orders:  -     levothyroxine 25 mcg tablet; Take 1 tablet (25 mcg total) by mouth daily  4. Incomplete rectal prolapse  5. Pre-diabetes  -     Hemoglobin A1C; Future  -     Urinalysis with microscopic; Future  -     Hemoglobin A1C  -     Urinalysis with microscopic  6. Dyslipidemia  -     Lipid panel; Future  -     Lipid panel  7. Screening for thyroid disorder  8. Screening for deficiency anemia  9. Vitamin D deficiency  -     Vitamin D 25 hydroxy; Future  -     Vitamin D 25 hydroxy        Depression Screening and Follow-up Plan: Patient was screened for depression during today's encounter. They screened negative with a PHQ-2 score of 0.          Discussion Summary and Plan:  Today's care plan and medications were reviewed with patient in detail and all their questions answered to their satisfaction.    Chief Complaint   Patient presents with    Follow-up      Subjective:  Patient is coming here for evaluation  regarding her hypertension, anxiety and depression.  She also had an episode of syncope while working.  She has been also under a lot of stress in the recent past and has taken off from work for couple of weeks.  Discussed with patient at length regarding stress management.  I reviewed the reports of the emergency room regarding near syncope.  Labs done in the emergency room reviewed.    Patient is being followed by the psychiatrist regarding her anxiety and depression.  Medications were adjusted        The following portions of the patient's history were reviewed and updated as appropriate: allergies, current medications, past family history, past medical history, past social history, past surgical history and problem list.    Review of Systems   Constitutional:  Negative for chills and fever.   HENT:  Negative for ear pain and sore throat.    Eyes:  Negative for pain and visual disturbance.   Respiratory:  Negative for cough and shortness of breath.    Cardiovascular:  Negative for chest pain and palpitations.   Gastrointestinal:  Negative for abdominal pain and vomiting.   Genitourinary:  Negative for dysuria and hematuria.   Musculoskeletal:  Negative for arthralgias and back pain.   Skin:  Negative for color change and rash.   Neurological:  Negative for seizures and syncope.   Psychiatric/Behavioral:  The patient is nervous/anxious.    All other systems reviewed and are negative.        Historical Information   Patient Active Problem List   Diagnosis    Hypothyroidism    HTN (hypertension)    Incomplete rectal prolapse    Generalized anxiety disorder    COVID-19     Past Medical History:   Diagnosis Date    Anxiety     Depression     Disease of thyroid gland     Ear problems     GERD (gastroesophageal reflux disease)     Hypertension     IBS (irritable bowel syndrome)      Past Surgical History:   Procedure Laterality Date    APPENDECTOMY      COLONOSCOPY       Social History     Substance and Sexual Activity  "  Alcohol Use Yes     Social History     Substance and Sexual Activity   Drug Use No     Social History     Tobacco Use   Smoking Status Never   Smokeless Tobacco Never     History reviewed. No pertinent family history.  Health Maintenance Due   Topic    Hepatitis C Screening     HIV Screening     Annual Physical     DTaP,Tdap,and Td Vaccines (1 - Tdap)    Cervical Cancer Screening     Influenza Vaccine (1)    COVID-19 Vaccine (2 - 2024-25 season)      Meds/Allergies       Current Outpatient Medications:     amLODIPine (NORVASC) 10 mg tablet, Take 1 tablet (10 mg total) by mouth daily, Disp: 90 tablet, Rfl: 1    FLUoxetine (PROzac) 10 mg capsule, Take 10 mg by mouth daily, Disp: , Rfl:     levothyroxine 25 mcg tablet, Take 1 tablet (25 mcg total) by mouth daily, Disp: 90 tablet, Rfl: 1    Lo Loestrin Fe 1 MG-10 MCG / 10 MCG TABS, , Disp: , Rfl:     mesalamine (CANASA) 1,000 mg suppository, Insert 1 suppository (1,000 mg total) into the rectum daily at bedtime, Disp: 90 suppository, Rfl: 2    multivitamin (THERAGRAN) TABS, Take 1 tablet by mouth daily, Disp: , Rfl:     venlafaxine (EFFEXOR) 100 MG tablet, Take 1 tablet (100 mg total) by mouth every morning, Disp: , Rfl:     ascorbic acid (VITAMIN C) 250 mg tablet, Take 250 mg by mouth daily (Patient not taking: Reported on 12/20/2024), Disp: , Rfl:       Objective:    Vitals:   /74 (BP Location: Right arm, Patient Position: Sitting, Cuff Size: Standard)   Pulse 76   Ht 5' 2\" (1.575 m)   Wt 50.7 kg (111 lb 12.8 oz)   SpO2 97%   BMI 20.45 kg/m²   Body mass index is 20.45 kg/m².  Vitals:    12/20/24 1458   Weight: 50.7 kg (111 lb 12.8 oz)       Physical Exam  Vitals and nursing note reviewed.   Constitutional:       Appearance: Normal appearance.   Cardiovascular:      Rate and Rhythm: Normal rate and regular rhythm.      Heart sounds: Normal heart sounds.   Pulmonary:      Effort: Pulmonary effort is normal.      Breath sounds: Normal breath sounds. " "  Musculoskeletal:         General: Normal range of motion.      Cervical back: Normal range of motion and neck supple.      Right lower leg: No edema.      Left lower leg: No edema.   Skin:     General: Skin is warm and dry.   Neurological:      General: No focal deficit present.      Mental Status: She is alert.   Psychiatric:         Mood and Affect: Mood normal.         Behavior: Behavior normal.         Lab Review   No visits with results within 2 Month(s) from this visit.   Latest known visit with results is:   [facility] on 10/27/2022   Component Date Value Ref Range Status    Hemoglobin A1C 07/23/2021 5.3   Final         Juan Teague MD        \"This note has been constructed using a voice recognition system.Therefore there may be syntax, spelling, and/or grammatical errors. Please call if you have any questions. \"  "

## 2024-12-20 ENCOUNTER — OFFICE VISIT (OUTPATIENT)
Dept: FAMILY MEDICINE CLINIC | Facility: CLINIC | Age: 29
End: 2024-12-20
Payer: COMMERCIAL

## 2024-12-20 ENCOUNTER — TELEPHONE (OUTPATIENT)
Age: 29
End: 2024-12-20

## 2024-12-20 VITALS
WEIGHT: 111.8 LBS | BODY MASS INDEX: 20.57 KG/M2 | OXYGEN SATURATION: 97 % | SYSTOLIC BLOOD PRESSURE: 131 MMHG | HEART RATE: 76 BPM | DIASTOLIC BLOOD PRESSURE: 74 MMHG | HEIGHT: 62 IN

## 2024-12-20 DIAGNOSIS — K62.3 INCOMPLETE RECTAL PROLAPSE: ICD-10-CM

## 2024-12-20 DIAGNOSIS — R73.03 PRE-DIABETES: ICD-10-CM

## 2024-12-20 DIAGNOSIS — E55.9 VITAMIN D DEFICIENCY: ICD-10-CM

## 2024-12-20 DIAGNOSIS — E78.5 DYSLIPIDEMIA: ICD-10-CM

## 2024-12-20 DIAGNOSIS — F41.1 GENERALIZED ANXIETY DISORDER: ICD-10-CM

## 2024-12-20 DIAGNOSIS — E03.9 HYPOTHYROIDISM, UNSPECIFIED TYPE: ICD-10-CM

## 2024-12-20 DIAGNOSIS — Z13.29 SCREENING FOR THYROID DISORDER: ICD-10-CM

## 2024-12-20 DIAGNOSIS — Z13.0 SCREENING FOR DEFICIENCY ANEMIA: ICD-10-CM

## 2024-12-20 DIAGNOSIS — I10 PRIMARY HYPERTENSION: Primary | ICD-10-CM

## 2024-12-20 PROCEDURE — 99214 OFFICE O/P EST MOD 30 MIN: CPT | Performed by: INTERNAL MEDICINE

## 2024-12-20 RX ORDER — AMLODIPINE BESYLATE 10 MG/1
10 TABLET ORAL DAILY
Status: SHIPPED
Start: 2024-12-20 | End: 2024-12-20 | Stop reason: SDUPTHER

## 2024-12-20 RX ORDER — AMLODIPINE BESYLATE 10 MG/1
10 TABLET ORAL DAILY
Qty: 90 TABLET | Refills: 1 | Status: SHIPPED | OUTPATIENT
Start: 2024-12-20 | End: 2024-12-20 | Stop reason: SDUPTHER

## 2024-12-20 RX ORDER — VENLAFAXINE 100 MG/1
100 TABLET ORAL EVERY MORNING
Status: SHIPPED
Start: 2024-12-20

## 2024-12-20 RX ORDER — LEVOTHYROXINE SODIUM 25 UG/1
25 TABLET ORAL DAILY
Qty: 90 TABLET | Refills: 1 | Status: SHIPPED | OUTPATIENT
Start: 2024-12-20

## 2024-12-20 RX ORDER — AMLODIPINE BESYLATE 10 MG/1
10 TABLET ORAL DAILY
Qty: 90 TABLET | Refills: 1 | Status: SHIPPED | OUTPATIENT
Start: 2024-12-20

## 2024-12-20 RX ORDER — FLUOXETINE 10 MG/1
10 CAPSULE ORAL DAILY
COMMUNITY
Start: 2024-12-19

## 2024-12-20 NOTE — TELEPHONE ENCOUNTER
Diamante from [facility] needs to know which dose of amlodipine pt is to take. It says 10mg in the sig but note to pharmacy is different- 5mg with 2.5 mg. Please, let them know which is correct.

## 2024-12-21 NOTE — ASSESSMENT & PLAN NOTE
Patient with anxiety and depression being followed by the psychiatrist currently she is on fluoxetine 10 mg daily along with Effexor 100 mg daily she was on 50 before the dosage was adjusted.  Again discussed at length regarding stress management and anxiety management depression management with the patient recommended to stay positive and stay focused

## 2024-12-21 NOTE — ASSESSMENT & PLAN NOTE
Blood pressure today is 131/70 4 repeat 1 also came around the same currently taking amlodipine 10 mg daily he was on a lower dose but the pressures were running high and the dosage was adjusted

## 2024-12-21 NOTE — ASSESSMENT & PLAN NOTE
Currently patient is taking levothyroxine 25 mcg daily we will continue with the same last TSH level was normal.

## 2025-01-12 DIAGNOSIS — E03.9 HYPOTHYROIDISM, UNSPECIFIED TYPE: ICD-10-CM

## 2025-01-14 RX ORDER — LEVOTHYROXINE SODIUM 25 UG/1
25 TABLET ORAL DAILY
Qty: 90 TABLET | Refills: 0 | Status: SHIPPED | OUTPATIENT
Start: 2025-01-14

## 2025-01-19 ENCOUNTER — ANESTHESIA EVENT (OUTPATIENT)
Dept: ANESTHESIOLOGY | Facility: HOSPITAL | Age: 30
End: 2025-01-19

## 2025-01-19 ENCOUNTER — ANESTHESIA (OUTPATIENT)
Dept: ANESTHESIOLOGY | Facility: HOSPITAL | Age: 30
End: 2025-01-19

## 2025-02-17 ENCOUNTER — ANESTHESIA EVENT (OUTPATIENT)
Dept: ANESTHESIOLOGY | Facility: HOSPITAL | Age: 30
End: 2025-02-17

## 2025-02-17 ENCOUNTER — ANESTHESIA (OUTPATIENT)
Dept: ANESTHESIOLOGY | Facility: HOSPITAL | Age: 30
End: 2025-02-17

## 2025-02-26 ENCOUNTER — TELEPHONE (OUTPATIENT)
Age: 30
End: 2025-02-26

## 2025-02-26 NOTE — TELEPHONE ENCOUNTER
Spoke with patient confirming procedure for 3/3 with Dr. Cortes. She has her prep instructions and her  arranged. She will be called Friday with her arrival time.

## 2025-02-28 DIAGNOSIS — I10 PRIMARY HYPERTENSION: ICD-10-CM

## 2025-02-28 RX ORDER — AMLODIPINE BESYLATE 10 MG/1
10 TABLET ORAL DAILY
Qty: 90 TABLET | Refills: 1 | Status: SHIPPED | OUTPATIENT
Start: 2025-02-28

## 2025-03-03 ENCOUNTER — ANESTHESIA (OUTPATIENT)
Dept: GASTROENTEROLOGY | Facility: AMBULARY SURGERY CENTER | Age: 30
End: 2025-03-03
Payer: COMMERCIAL

## 2025-03-03 ENCOUNTER — HOSPITAL ENCOUNTER (OUTPATIENT)
Dept: GASTROENTEROLOGY | Facility: AMBULARY SURGERY CENTER | Age: 30
Setting detail: OUTPATIENT SURGERY
Discharge: HOME | End: 2025-03-03
Attending: INTERNAL MEDICINE
Payer: COMMERCIAL

## 2025-03-03 VITALS
SYSTOLIC BLOOD PRESSURE: 133 MMHG | OXYGEN SATURATION: 96 % | HEART RATE: 91 BPM | TEMPERATURE: 97.3 F | DIASTOLIC BLOOD PRESSURE: 78 MMHG | RESPIRATION RATE: 16 BRPM

## 2025-03-03 DIAGNOSIS — Z12.11 SCREENING FOR COLON CANCER: ICD-10-CM

## 2025-03-03 DIAGNOSIS — Z86.0101 HISTORY OF ADENOMATOUS POLYP OF COLON: ICD-10-CM

## 2025-03-03 LAB
EXT PREGNANCY TEST URINE: NEGATIVE
EXT. CONTROL: NORMAL

## 2025-03-03 PROCEDURE — 88305 TISSUE EXAM BY PATHOLOGIST: CPT | Performed by: PATHOLOGY

## 2025-03-03 PROCEDURE — 81025 URINE PREGNANCY TEST: CPT | Performed by: ANESTHESIOLOGY

## 2025-03-03 PROCEDURE — 45380 COLONOSCOPY AND BIOPSY: CPT | Performed by: INTERNAL MEDICINE

## 2025-03-03 RX ORDER — PROPOFOL 10 MG/ML
INJECTION, EMULSION INTRAVENOUS CONTINUOUS PRN
Status: DISCONTINUED | OUTPATIENT
Start: 2025-03-03 | End: 2025-03-03

## 2025-03-03 RX ORDER — PROPRANOLOL HCL 20 MG
20 TABLET ORAL ONCE AS NEEDED
COMMUNITY

## 2025-03-03 RX ORDER — LIDOCAINE HYDROCHLORIDE 10 MG/ML
INJECTION, SOLUTION EPIDURAL; INFILTRATION; INTRACAUDAL; PERINEURAL AS NEEDED
Status: DISCONTINUED | OUTPATIENT
Start: 2025-03-03 | End: 2025-03-03

## 2025-03-03 RX ORDER — SODIUM CHLORIDE, SODIUM LACTATE, POTASSIUM CHLORIDE, CALCIUM CHLORIDE 600; 310; 30; 20 MG/100ML; MG/100ML; MG/100ML; MG/100ML
125 INJECTION, SOLUTION INTRAVENOUS CONTINUOUS
Status: DISCONTINUED | OUTPATIENT
Start: 2025-03-03 | End: 2025-03-07 | Stop reason: HOSPADM

## 2025-03-03 RX ORDER — PROPOFOL 10 MG/ML
INJECTION, EMULSION INTRAVENOUS AS NEEDED
Status: DISCONTINUED | OUTPATIENT
Start: 2025-03-03 | End: 2025-03-03

## 2025-03-03 RX ADMIN — SODIUM CHLORIDE, SODIUM LACTATE, POTASSIUM CHLORIDE, AND CALCIUM CHLORIDE 125 ML/HR: .6; .31; .03; .02 INJECTION, SOLUTION INTRAVENOUS at 11:38

## 2025-03-03 RX ADMIN — PROPOFOL 100 MG: 10 INJECTION, EMULSION INTRAVENOUS at 11:56

## 2025-03-03 RX ADMIN — PROPOFOL 100 MCG/KG/MIN: 10 INJECTION, EMULSION INTRAVENOUS at 11:56

## 2025-03-03 RX ADMIN — LIDOCAINE HYDROCHLORIDE 50 MG: 10 INJECTION, SOLUTION EPIDURAL; INFILTRATION; INTRACAUDAL; PERINEURAL at 11:56

## 2025-03-03 NOTE — PERIOPERATIVE NURSING NOTE
Patient transferred to GI [place] 1 - via stretcher. Bedside report given to receiving RN. call bell placed in reach, side rails up.

## 2025-03-03 NOTE — H&P
History and Physical -  [facility]  Jane Hanks 29 y.o. female MRN: [id number]                  HPI: Jane Hanks is a 29 y.o. year old female who presents for surveillance of prior adenomatous polyps      REVIEW OF SYSTEMS: Per the HPI, and otherwise unremarkable.    Historical Information   Past Medical History:   Diagnosis Date    Anxiety     Depression     Disease of thyroid gland     Ear problems     GERD (gastroesophageal reflux disease)     Hypertension     IBS (irritable bowel syndrome)      Past Surgical History:   Procedure Laterality Date    APPENDECTOMY      COLONOSCOPY       Social History   Social History     Substance and Sexual Activity   Alcohol Use Yes     Social History     Substance and Sexual Activity   Drug Use No     Social History     Tobacco Use   Smoking Status Never   Smokeless Tobacco Never     History reviewed. No pertinent family history.    Meds/Allergies       Current Outpatient Medications:     Lo Loestrin Fe 1 MG-10 MCG / 10 MCG TABS    mesalamine (CANASA) 1,000 mg suppository    multivitamin (THERAGRAN) TABS    propranolol (INDERAL) 20 mg tablet    amLODIPine (NORVASC) 10 mg tablet    ascorbic acid (VITAMIN C) 250 mg tablet    FLUoxetine (PROzac) 10 mg capsule    levothyroxine 25 mcg tablet    venlafaxine (EFFEXOR) 100 MG tablet    Current Facility-Administered Medications:     lactated ringers infusion, 125 mL/hr, Intravenous, Continuous, 125 mL/hr at 03/03/25 1138    Allergies   Allergen Reactions    Sulfa Antibiotics Hives       Objective     /94   Pulse (!) 116   Temp (!) 97.3 °F (36.3 °C) (Skin)   Resp 18   SpO2 99%       PHYSICAL EXAM    Gen: NAD  Head: NCAT  CV: RRR  CHEST: Clear  ABD: soft, NT/ND  EXT: no edema      ASSESSMENT/PLAN:  This is a 29 y.o. year old female here for colonoscopy, and she is stable and optimized for her procedure.

## 2025-03-03 NOTE — ANESTHESIA PREPROCEDURE EVALUATION
Procedure:  COLONOSCOPY    Relevant Problems   CARDIO   (+) HTN (hypertension)      ENDO   (+) Hypothyroidism      NEURO/PSYCH   (+) Generalized anxiety disorder        Physical Exam    Airway    Mallampati score: II  TM Distance: >3 FB  Neck ROM: full     Dental   No notable dental hx     Cardiovascular  Cardiovascular exam normal    Pulmonary  Pulmonary exam normal     Other Findings  post-pubertal.      Anesthesia Plan  ASA Score- 2     Anesthesia Type- IV sedation with anesthesia with ASA Monitors.         Additional Monitors:     Airway Plan:            Plan Factors-Exercise tolerance (METS): >4 METS.    Chart reviewed.   Existing labs reviewed. Patient summary reviewed.    Patient is not a current smoker.              Induction-     Postoperative Plan-     Perioperative Resuscitation Plan - Level 1 - Full Code.       Informed Consent- Anesthetic plan and risks discussed with patient.  I personally reviewed this patient with the CRNA. Discussed and agreed on the Anesthesia Plan with the CRNA..      NPO Status:  Vitals Value Taken Time   Date of last liquid 03/03/25 03/03/25 1108   Time of last liquid 0400 03/03/25 1108   Date of last solid 03/01/25 03/03/25 1108   Time of last solid

## 2025-03-03 NOTE — ANESTHESIA POSTPROCEDURE EVALUATION
Post-Op Assessment Note    CV Status:  Stable    Pain management: adequate       Mental Status:  Alert and awake   Hydration Status:  Euvolemic   PONV Controlled:  Controlled   Airway Patency:  Patent     Post Op Vitals Reviewed: Yes    No anethesia notable event occurred.    Staff: Anesthesiologist           Last Filed PACU Vitals:  Vitals Value Taken Time   Temp 97    Pulse 108    /79    Resp 14    SpO2 100

## 2025-03-06 PROCEDURE — 88305 TISSUE EXAM BY PATHOLOGIST: CPT | Performed by: PATHOLOGY

## 2025-03-07 ENCOUNTER — RESULTS FOLLOW-UP (OUTPATIENT)
Age: 30
End: 2025-03-07

## 2025-03-07 NOTE — RESULT ENCOUNTER NOTE
Please call the patient regarding results.  Rectal biopsies completely benign and suggestive of prolapse as she suggested.  Recommend daily OTC fiber supplementation.  Repeat colonoscopy in 5 years given prior history of adenomatous polyp

## 2025-04-12 DIAGNOSIS — E03.9 HYPOTHYROIDISM, UNSPECIFIED TYPE: ICD-10-CM

## 2025-04-14 RX ORDER — LEVOTHYROXINE SODIUM 25 UG/1
25 TABLET ORAL DAILY
Qty: 90 TABLET | Refills: 0 | Status: SHIPPED | OUTPATIENT
Start: 2025-04-14

## 2025-04-25 ENCOUNTER — TELEPHONE (OUTPATIENT)
Age: 30
End: 2025-04-25

## 2025-04-25 NOTE — TELEPHONE ENCOUNTER
Patients GI provider:  Dr. Cortes    Number to return call: [phone]    Reason for call: Pt calling stating her insurance is requiring a letter of medical necessity for the colonoscopy that was completed on 3/3/25.    Scheduled procedure/appointment date if applicable: N/A

## 2025-06-25 DIAGNOSIS — E03.9 HYPOTHYROIDISM, UNSPECIFIED TYPE: ICD-10-CM

## 2025-06-25 DIAGNOSIS — I10 PRIMARY HYPERTENSION: ICD-10-CM

## 2025-06-26 RX ORDER — AMLODIPINE BESYLATE 10 MG/1
10 TABLET ORAL DAILY
Qty: 90 TABLET | Refills: 0 | Status: SHIPPED | OUTPATIENT
Start: 2025-06-26

## 2025-06-26 RX ORDER — LEVOTHYROXINE SODIUM 25 UG/1
25 TABLET ORAL DAILY
Qty: 90 TABLET | Refills: 0 | Status: SHIPPED | OUTPATIENT
Start: 2025-06-26

## 2025-08-07 DIAGNOSIS — I10 PRIMARY HYPERTENSION: ICD-10-CM

## 2025-08-08 RX ORDER — AMLODIPINE BESYLATE 10 MG/1
10 TABLET ORAL DAILY
Qty: 90 TABLET | Refills: 1 | Status: SHIPPED | OUTPATIENT
Start: 2025-08-08